# Patient Record
Sex: FEMALE | Race: ASIAN | Employment: OTHER | URBAN - METROPOLITAN AREA
[De-identification: names, ages, dates, MRNs, and addresses within clinical notes are randomized per-mention and may not be internally consistent; named-entity substitution may affect disease eponyms.]

---

## 2017-02-09 ENCOUNTER — OFFICE VISIT (OUTPATIENT)
Dept: FAMILY MEDICINE CLINIC | Age: 62
End: 2017-02-09

## 2017-02-09 VITALS
BODY MASS INDEX: 19.1 KG/M2 | TEMPERATURE: 97.5 F | SYSTOLIC BLOOD PRESSURE: 127 MMHG | RESPIRATION RATE: 20 BRPM | WEIGHT: 91 LBS | HEIGHT: 58 IN | HEART RATE: 67 BPM | DIASTOLIC BLOOD PRESSURE: 73 MMHG | OXYGEN SATURATION: 97 %

## 2017-02-09 DIAGNOSIS — J02.9 PHARYNGITIS, UNSPECIFIED ETIOLOGY: Primary | ICD-10-CM

## 2017-02-09 LAB
S PYO AG THROAT QL: NEGATIVE
VALID INTERNAL CONTROL?: YES

## 2017-02-09 RX ORDER — AZITHROMYCIN 250 MG/1
TABLET, FILM COATED ORAL
Qty: 6 TAB | Refills: 0 | Status: SHIPPED | OUTPATIENT
Start: 2017-02-09 | End: 2017-02-14 | Stop reason: ALTCHOICE

## 2017-02-09 NOTE — MR AVS SNAPSHOT
Visit Information Date & Time Provider Department Dept. Phone Encounter #  
 2/9/2017 10:30 AM [facility] [id number] Your Appointments 2/14/2017  1:15 PM  
Office Visit with Radha Doss MD  
[facility] ([address]) Appt Note: dx CPE  
 [place]. [address]  
  
   
 [address] [place] Upcoming Health Maintenance Date Due ZOSTER VACCINE AGE 60> 7/26/2015 COLONOSCOPY 6/18/2016 BREAST CANCER SCRN MAMMOGRAM 6/23/2016 INFLUENZA AGE 9 TO ADULT 8/1/2016 PAP AKA CERVICAL CYTOLOGY 12/17/2018 DTaP/Tdap/Td series (2 - Td) 4/21/2024 Allergies as of 2/9/2017  Review Complete On: 2/9/2017 By: Radha Doss MD  
 No Known Allergies Current Immunizations  Reviewed on 4/21/2014 Name Date Influenza Vaccine PF 11/5/2013 Tdap 4/21/2014 Not reviewed this visit You Were Diagnosed With   
  
 Codes Comments Pharyngitis, unspecified etiology    -  Primary ICD-10-CM: J02.9 ICD-9-CM: 455 Vitals BP Pulse Temp Resp Height(growth percentile) Weight(growth percentile) 127/73 (BP 1 Location: Left arm, BP Patient Position: Sitting) 67 97.5 °F (36.4 °C) (Oral) 20 4' 10\" (1.473 m) 91 lb (41.3 kg) SpO2 BMI OB Status Smoking Status 97% 19.02 kg/m2 Postmenopausal Never Smoker Vitals History BMI and BSA Data Body Mass Index Body Surface Area 19.02 kg/m 2 1.3 m 2 Preferred Pharmacy Pharmacy Name Phone [facility] [address] [place] [address] Your Updated Medication List  
  
   
This list is accurate as of: 2/9/17 11:09 AM.  Always use your most recent med list.  
  
  
  
  
 acetaminophen 500 mg tablet Commonly known as:  TYLENOL Take  by mouth every six (6) hours as needed for Pain. azithromycin 250 mg tablet Commonly known as:  Osmin Haddad Take 2 tablets today, then take 1 tablet daily  
  
 ergocalciferol 50,000 unit capsule Commonly known as:  ERGOCALCIFEROL Take 1 Cap by mouth every seven (7) days. FLUVIRIN 8029-6555 (PF) Syrg injection Generic drug:  influenza vaccine 2015-16 (4 yr+)(PF)  
  
  
  
  
Prescriptions Printed Refills  
 azithromycin (ZITHROMAX) 250 mg tablet 0 Sig: Take 2 tablets today, then take 1 tablet daily Class: Print We Performed the Following AMB POC RAPID STREP A [84041 CPT(R)] Patient Instructions Sore Throat: Care Instructions Your Care Instructions Infection by bacteria or a virus causes most sore throats. Cigarette smoke, dry air, air pollution, allergies, and yelling can also cause a sore throat. Sore throats can be painful and annoying. Fortunately, most sore throats go away on their own. If you have a bacterial infection, your doctor may prescribe antibiotics. Follow-up care is a key part of your treatment and safety. Be sure to make and go to all appointments, and call your doctor if you are having problems. It's also a good idea to know your test results and keep a list of the medicines you take. How can you care for yourself at home? · If your doctor prescribed antibiotics, take them as directed. Do not stop taking them just because you feel better. You need to take the full course of antibiotics. · Gargle with warm salt water once an hour to help reduce swelling and relieve discomfort. Use 1 teaspoon of salt mixed in 1 cup of warm water. · Take an over-the-counter pain medicine, such as acetaminophen (Tylenol), ibuprofen (Advil, Motrin), or naproxen (Aleve). Read and follow all instructions on the label. · Be careful when taking over-the-counter cold or flu medicines and Tylenol at the same time.  Many of these medicines have acetaminophen, which is Tylenol. Read the labels to make sure that you are not taking more than the recommended dose. Too much acetaminophen (Tylenol) can be harmful. · Drink plenty of fluids. Fluids may help soothe an irritated throat. Hot fluids, such as tea or soup, may help decrease throat pain. · Use over-the-counter throat lozenges to soothe pain. Regular cough drops or hard candy may also help. These should not be given to young children because of the risk of choking. · Do not smoke or allow others to smoke around you. If you need help quitting, talk to your doctor about stop-smoking programs and medicines. These can increase your chances of quitting for good. · Use a vaporizer or humidifier to add moisture to your bedroom. Follow the directions for cleaning the machine. When should you call for help? Call your doctor now or seek immediate medical care if: 
· You have new or worse trouble swallowing. · Your sore throat gets much worse on one side. Watch closely for changes in your health, and be sure to contact your doctor if you do not get better as expected. Where can you learn more? Go to http://tiago-jay.info/. Enter [phone] in the search box to learn more about \"Sore Throat: Care Instructions. \" Current as of: July 29, 2016 Content Version: 11.1 © 5416-9750 Bos[employer]. Care instructions adapted under license by MakeSpace (which disclaims liability or warranty for this information). If you have questions about a medical condition or this instruction, always ask your healthcare professional. [place] [address] any warranty or liability for your use of this information. Introducing [facility] [place] [facility]! Dear Kelly Davidson: 
Thank you for requesting a JamHub account. Our records indicate that you already have an active JamHub account. You can access your account anytime at [email or web address] Did you know that you can access your hospital and ER discharge instructions at any time in [employer]? You can also review all of your test results from your hospital stay or ER visit. Additional Information If you have questions, please visit the Frequently Asked Questions section of the [employer] website at [email or web address] Remember, [employer] is NOT to be used for urgent needs. For medical emergencies, dial 911. Now available from your iPhone and Android! Please provide this summary of care documentation to your next provider. Your primary care clinician is listed as Shahab Pain. If you have any questions after today's visit, please call [phone].

## 2017-02-09 NOTE — PROGRESS NOTES
Chief Complaint   Patient presents with    Sore Throat     x 5 days       Pt is a 64y.o. 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 Pertinent positive as above in HPI. All others were negative    Patient Active Problem List   Diagnosis Code    Vitamin D deficiency E55.9    Esophagitis K20.9    Hyperlipidemia E78.5    Osteoporosis M81.0    Atypical squamous cells of undetermined significance on cytologic smear of cervix (ASC-US) R87.610    Dyspareunia, female N94.10       Past Medical History   Diagnosis Date    Arthritis     ASCUS with positive high risk HPV 11/5/2013     Sees Gyn    GERD (gastroesophageal reflux disease)     Hypercholesterolemia     PUD (peptic ulcer disease)      as a teenager       Current Outpatient Prescriptions   Medication Sig Dispense Refill    azithromycin (ZITHROMAX) 250 mg tablet Take 2 tablets today, then take 1 tablet daily 6 Tab 0    acetaminophen (TYLENOL) 500 mg tablet Take  by mouth every six (6) hours as needed for Pain.  FLUVIRIN 0863-2190, PF, syrg injection   0    ergocalciferol (ERGOCALCIFEROL) 50,000 unit capsule Take 1 Cap by mouth every seven (7) days.  12 Cap 1       History   Smoking Status    Never Smoker   Smokeless Tobacco    Never Used       No Known Allergies    Patient Labs were reviewed: yes      Patient Past Records were reviewed:  yes        Objective:     Vitals:    02/09/17 1036   BP: 127/73   Pulse: 67   Resp: 20   Temp: 97.5 °F (36.4 °C)   TempSrc: Oral   SpO2: 97%   Weight: 91 lb (41.3 kg) Height: 4' 10\" (1.473 m)     Body mass index is 19.02 kg/(m^2). Exam:   Appearance: alert, well appearing,  oriented to person, place, and time, acyanotic, in no respiratory distress and well hydrated. HEENT:  NC/AT, pink conj, anicteric sclerae, nasal congestion but no sinus tenderness; tonsillo-pharyngeal congestion noted but no exudates  Neck:  No cervical lymphadenopathy, no JVD, no thyromegaly, no carotid bruit  Heart:  RRR without M/R/G  Lungs:  CTAB, no rhonchi, rales, or wheezes with good air exchange   Ext:  No C/C/E    Skin: no rash  Neuro: no lateralizing signs, CNs II-XII intact    Rapid strep is negative    Assessment/ Plan:   Frederick Viera was seen today for sore throat. Diagnoses and all orders for this visit:    Pharyngitis, unspecified etiology-reassured this is likely viral; she will take antibiotics only if sxs persist after 7 days from onset or worsen   -     AMB POC RAPID STREP A  -     azithromycin (ZITHROMAX) 250 mg tablet; Take 2 tablets today, then take 1 tablet daily      Follow-up Disposition:  Return if symptoms worsen or fail to improve, otherwise RTC for previous appt. I have discussed the diagnosis with the patient and the intended plan as seen in the above orders. The patient has received an After-Visit Summary and questions were answered concerning future plans. Medication Side Effects and Warnings were discussed with patient: yes    Patient verbalized understanding of above instructions.     Kindra Gresham MD  Internal Medicine  [facility]

## 2017-02-09 NOTE — PROGRESS NOTES
Pt here today for a sore throat x 5 days    1. Have you been to the ER, urgent care clinic since your last visit? Hospitalized since your last visit? No    2. Have you seen or consulted any other health care providers outside of the [address] [place] [address] since your last visit? Include any pap smears or colon screening.  No

## 2017-02-14 ENCOUNTER — OFFICE VISIT (OUTPATIENT)
Dept: FAMILY MEDICINE CLINIC | Age: 62
End: 2017-02-14

## 2017-02-14 VITALS
TEMPERATURE: 97.3 F | OXYGEN SATURATION: 96 % | HEART RATE: 66 BPM | HEIGHT: 58 IN | DIASTOLIC BLOOD PRESSURE: 67 MMHG | WEIGHT: 90 LBS | BODY MASS INDEX: 18.89 KG/M2 | RESPIRATION RATE: 16 BRPM | SYSTOLIC BLOOD PRESSURE: 113 MMHG

## 2017-02-14 DIAGNOSIS — Z00.00 WELL WOMAN EXAM (NO GYNECOLOGICAL EXAM): Primary | ICD-10-CM

## 2017-02-14 DIAGNOSIS — E78.5 HYPERLIPIDEMIA, UNSPECIFIED HYPERLIPIDEMIA TYPE: ICD-10-CM

## 2017-02-14 DIAGNOSIS — E55.9 VITAMIN D DEFICIENCY: ICD-10-CM

## 2017-02-14 DIAGNOSIS — M81.0 OSTEOPOROSIS: ICD-10-CM

## 2017-02-14 DIAGNOSIS — R87.610 ATYPICAL SQUAMOUS CELLS OF UNDETERMINED SIGNIFICANCE ON CYTOLOGIC SMEAR OF CERVIX (ASC-US): ICD-10-CM

## 2017-02-14 LAB
25(OH)D3 SERPL-MCNC: 19.8 NG/ML (ref 32–100)
A-G RATIO,AGRAT: 1.6 RATIO (ref 1.1–2.6)
ALBUMIN SERPL-MCNC: 4.5 G/DL (ref 3.5–5)
ALP SERPL-CCNC: 65 U/L (ref 40–120)
ALT SERPL-CCNC: 10 U/L (ref 5–40)
ANION GAP SERPL CALC-SCNC: 17 MMOL/L
AST SERPL W P-5'-P-CCNC: 18 U/L (ref 10–37)
BASOPHILS ABS-DIF,2030: 0.1 K/UL (ref 0–0.2)
BASOPHILS C MAN (DIFF), 1068: 1 % (ref 0–2)
BILIRUB SERPL-MCNC: 0.4 MG/DL (ref 0.2–1.2)
BUN SERPL-MCNC: 10 MG/DL (ref 6–22)
CALCIUM SERPL-MCNC: 9.3 MG/DL (ref 8.4–10.5)
CHLORIDE SERPL-SCNC: 98 MMOL/L (ref 98–110)
CHOLEST SERPL-MCNC: 223 MG/DL (ref 110–200)
CO2 SERPL-SCNC: 24 MMOL/L (ref 20–32)
CREAT SERPL-MCNC: 0.6 MG/DL (ref 0.8–1.4)
EOS ABS-DIF,2069: 0.1 K/UL (ref 0–0.5)
EOSINOPHILS C MAN (DIFF), 1067: 2 % (ref 0–6)
ERYTHROCYTE [DISTWIDTH] IN BLOOD BY AUTOMATED COUNT: 12.9 % (ref 10–16)
GFRAA, 66117: >60
GFRNA, 66118: >60
GLOBULIN,GLOB: 2.8 G/DL (ref 2–4)
GLUCOSE SERPL-MCNC: 84 MG/DL (ref 65–99)
HCT VFR BLD AUTO: 37.2 % (ref 35.1–48)
HDLC SERPL-MCNC: 86 MG/DL (ref 40–59)
HGB BLD-MCNC: 12.7 G/DL (ref 11.7–16)
LDLC SERPL CALC-MCNC: 122 MG/DL (ref 50–99)
LYMPHOCYTES C MAN (DIFF), 1065: 35 % (ref 27–45)
LYMPHS ABS-DIF,2105: 1.8 K/UL (ref 1–4.8)
MCH RBC QN AUTO: 29 PG (ref 26–34)
MCHC RBC AUTO-ENTMCNC: 34 G/DL (ref 32–36)
MCV RBC AUTO: 84 FL (ref 80–95)
MONOCYTES ABS-DIF,2141: 0.2 K/UL (ref 0.1–0.9)
MONOCYTES C MAN (DIFF), 1066: 3 % (ref 3–9)
NEUTROPHILS ABS,2156: 3 K/UL (ref 1.8–7.7)
NEUTROPHILS C MAN (DIFF), 1064: 59 % (ref 40–75)
NORMOCHROMIC CELLAVISION, 1078: NORMAL
NORMOCYTIC CELLAVISION, 1079: NORMAL
PLATELET # BLD AUTO: 206 K/UL (ref 140–440)
POTASSIUM SERPL-SCNC: 3.9 MMOL/L (ref 3.5–5.5)
PROT SERPL-MCNC: 7.3 G/DL (ref 6.2–8.1)
RBC # BLD AUTO: 4.45 M/UL (ref 3.8–5.2)
SMEAR EVAL, 1131: NORMAL
SODIUM SERPL-SCNC: 139 MMOL/L (ref 133–145)
TRIGL SERPL-MCNC: 74 MG/DL (ref 40–149)
VLDLC SERPL CALC-MCNC: 15 MG/DL (ref 8–30)
WBC # BLD AUTO: 5.1 K/UL (ref 4–11)

## 2017-02-14 NOTE — PROGRESS NOTES
Tung Flores is here today to follow up for chronic conditions. 1. Have you been to the ER, urgent care clinic since your last visit? Hospitalized since your last visit? No    2. Have you seen or consulted any other health care providers outside of [facility] since your [facility] visit? Include any pap smears or colon screening.  No

## 2017-02-14 NOTE — MR AVS SNAPSHOT
Visit Information Date & Time Provider Department Dept. Phone Encounter #  
 2/14/2017  1:15 PM Lucas Maya MD [facility] 13 [id number] Follow-up Instructions Return in about 1 year (around 2/14/2018) for physical.  
  
Upcoming Health Maintenance Date Due ZOSTER VACCINE AGE 60> 7/26/2015 COLONOSCOPY 6/18/2016 BREAST CANCER SCRN MAMMOGRAM 6/23/2016 PAP AKA CERVICAL CYTOLOGY 12/17/2018 DTaP/Tdap/Td series (2 - Td) 4/21/2024 Allergies as of 2/14/2017  Review Complete On: 2/14/2017 By: Shirin Dockery LPN No Known Allergies Current Immunizations  Reviewed on 2/14/2017 Name Date Influenza Vaccine 10/1/2016 Influenza Vaccine PF 11/5/2013 Tdap 4/21/2014 Reviewed by Lucas Maya MD on 2/14/2017 at  1:23 PM  
 Reviewed by Lucas Maya MD on 2/14/2017 at  1:23 PM  
You Were Diagnosed With   
  
 Codes Comments Well woman exam (no gynecological exam)    -  Primary ICD-10-CM: Z00.00 ICD-9-CM: V70.0 Vitamin D deficiency     ICD-10-CM: E55.9 ICD-9-CM: 268.9 Hyperlipidemia, unspecified hyperlipidemia type     ICD-10-CM: E78.5 ICD-9-CM: 272.4 Osteoporosis     ICD-10-CM: M81.0 ICD-9-CM: 733.00 Atypical squamous cells of undetermined significance on cytologic smear of cervix (ASC-US)     ICD-10-CM: R87.610 ICD-9-CM: 795.01 Vitals BP Pulse Temp Resp Height(growth percentile) Weight(growth percentile) 113/67 66 97.3 °F (36.3 °C) (Oral) 16 4' 10\" (1.473 m) 90 lb (40.8 kg) SpO2 BMI OB Status Smoking Status 96% 18.81 kg/m2 Postmenopausal Never Smoker Vitals History BMI and BSA Data Body Mass Index Body Surface Area  
 18.81 kg/m 2 1.29 m 2 Preferred Pharmacy Pharmacy Name Phone [facility] [address], [address] [place] [address] [phone] Your Updated Medication List  
  
Notice  As of 2/14/2017  1:51 PM  
 You have not been prescribed any medications. We Performed the Following REFERRAL TO GASTROENTEROLOGY [NVF52 Custom] Comments:  
 Please evaluate patient for screening colonoscopy done 1st week of June with Dr. Dat Alas at [facility] do open access colonoscopy REFERRAL TO GYNECOLOGY [REF30 Custom] Comments:  
 Please evaluate patient for follow up re cervical lesion noted by Dr. Maribel Glynn on 8/2016-wants to see Dr. Sheena Shone Follow-up Instructions Return in about 1 year (around 2/14/2018) for physical.  
  
To-Do List   
 02/14/2017 Lab:  CBC WITH AUTOMATED DIFF   
  
 02/14/2017 Lab:  LIPID PANEL   
  
 02/14/2017 Lab:  METABOLIC PANEL, COMPREHENSIVE   
  
 02/14/2017 Lab:  VITAMIN D, 25 HYDROXY   
  
 04/15/2017 Imaging:  ELEAZAR MAMMO BI SCREENING INCL CAD Referral Information Referral ID Referred By Referred To  
  
 [id number] Jordyn Rivera Not Available Visits Status Start Date End Date 1 New Request 2/14/17 2/14/18 If your referral has a status of pending review or denied, additional information will be sent to support the outcome of this decision. Referral ID Referred By Referred To  
 [id number] Jordyn Rivera Not Available Visits Status Start Date End Date 1 New Request 2/14/17 2/14/18 If your referral has a status of pending review or denied, additional information will be sent to support the outcome of this decision. Patient Instructions Well Visit, Women 48 to 72: Care Instructions Your Care Instructions Physical exams can help you stay healthy. Your doctor has checked your overall health and may have suggested ways to take good care of yourself. He or she also may have recommended tests. At home, you can help prevent illness with healthy eating, regular exercise, and other steps. Follow-up care is a key part of your treatment and safety.  Be sure to make and go to all appointments, and call your doctor if you are having problems. It's also a good idea to know your test results and keep a list of the medicines you take. How can you care for yourself at home? · Reach and stay at a healthy weight. This will lower your risk for many problems, such as obesity, diabetes, heart disease, and high blood pressure. · Get at least 30 minutes of exercise on most days of the week. Walking is a good choice. You also may want to do other activities, such as running, swimming, cycling, or playing tennis or team sports. · Do not smoke. Smoking can make health problems worse. If you need help quitting, talk to your doctor about stop-smoking programs and medicines. These can increase your chances of quitting for good. · Protect your skin from too much sun. When you're outdoors from 10 a.m. to 4 p.m., stay in the shade or cover up with clothing and a hat with a wide brim. Wear sunglasses that block UV rays. Even when it's cloudy, put broad-spectrum sunscreen (SPF 30 or higher) on any exposed skin. · See a dentist one or two times a year for checkups and to have your teeth cleaned. · Wear a seat belt in the car. · Limit alcohol to 1 drink a day. Too much alcohol can cause health problems. Follow your doctor's advice about when to have certain tests. These tests can spot problems early. · Cholesterol. Your doctor will tell you how often to have this done based on your age, family history, or other things that can increase your risk for heart attack and stroke. · Blood pressure. Have your blood pressure checked during a routine doctor visit. Your doctor will tell you how often to check your blood pressure based on your age, your blood pressure results, and other factors. · Mammogram. Ask your doctor how often you should have a mammogram, which is an X-ray of your breasts. A mammogram can spot breast cancer before it can be felt and when it is easiest to treat. · Pap test and pelvic exam. Ask your doctor how often you should have a Pap test. You may not need to have a Pap test as often as you used to. · Vision. Have your eyes checked every year or two or as often as your doctor suggests. Some experts recommend that you have yearly exams for glaucoma and other age-related eye problems starting at age 48. · Hearing. Tell your doctor if you notice any change in your hearing. You can have tests to find out how well you hear. · Diabetes. Ask your doctor whether you should have tests for diabetes. · Colon cancer. You should begin tests for colon cancer at age 48. You may have one of several tests. Your doctor will tell you how often to have tests based on your age and risk. Risks include whether you already had a precancerous polyp removed from your colon or whether your parents, sisters and brothers, or children have had colon cancer. · Thyroid disease. Talk to your doctor about whether to have your thyroid checked as part of a regular physical exam. Women have an increased chance of a thyroid problem. · Osteoporosis. You should begin tests for bone density at age 72. If you are younger than 72, ask your doctor whether you have factors that may increase your risk for this disease. You may want to have this test before age 72. · Heart attack and stroke risk. At least every 4 to 6 years, you should have your risk for heart attack and stroke assessed. Your doctor uses factors such as your age, blood pressure, cholesterol, and whether you smoke or have diabetes to show what your risk for a heart attack or stroke is over the next 10 years. When should you call for help? Watch closely for changes in your health, and be sure to contact your doctor if you have any problems or symptoms that concern you. Where can you learn more? Go to http://tiago-jay.info/.  
Enter P720 in the search box to learn more about \"Well Visit, Women 48 to 65: Care Instructions. \" Current as of: July 19, 2016 Content Version: 11.1 © 5955-6257 [employer]. Care instructions adapted under license by Fondu (which disclaims liability or warranty for this information). If you have questions about a medical condition or this instruction, always ask your healthcare professional. [address] any warranty or liability for your use of this information. Introducing [facility] & HEALTH SERVICES! Dear Abida Lei: 
Thank you for requesting a EVERFANS account. Our records indicate that you already have an active EVERFANS account. You can access your account anytime at [email or web address] Did you know that you can access your hospital and ER discharge instructions at any time in EVERFANS? You can also review all of your test results from your hospital stay or ER visit. Additional Information If you have questions, please visit the Frequently Asked Questions section of the EVERFANS website at [email or web address] Remember, EVERFANS is NOT to be used for urgent needs. For medical emergencies, dial 911. Now available from your iPhone and Android! Please provide this summary of care documentation to your next provider. Your primary care clinician is listed as Vinny Boateng. If you have any questions after today's visit, please call [phone].

## 2017-02-14 NOTE — PROGRESS NOTES
Chief Complaint   Patient presents with    Osteoporosis     Follow up    Vitamin D Deficiency    Cholesterol Problem       Pt is a 64y.o. year old female who presents for her annual wellness visit    Health Maintenance Due   Topic Date Due    ZOSTER VACCINE AGE 60>  07/26/2015    COLONOSCOPY  06/18/2016    BREAST CANCER SCRN MAMMOGRAM  06/23/2016     Dexa 9/2015-osteoporosis  Mammo last done 2014    Last PAP 8/2016/Gyn note reviewed  Pelvic: External genitalia normal, Vagina normal with physiologic discharge, cervix with an area of brown discoloration from 4 to 7 o'clock just exterior to the os and with slight oozing of blood, no CMT. Pap and HPV obtained.    Repeat Pap. Cervical lesion present, not previously noted. Will follow. Patient says she does not have a follow up visit with Gyn    ROS:    Pt denies: Wt loss, Fever/Chills, HA, Visual changes, Fatigue, Chest pain, SOB, FRANKLIN, Abd pain, N/V/D/C, Blood in stool or urine, Edema. Pertinent positive as above in HPI.  All others were negative    Patient Active Problem List   Diagnosis Code    Vitamin D deficiency E55.9    Esophagitis K20.9    Hyperlipidemia E78.5    Osteoporosis M81.0    Atypical squamous cells of undetermined significance on cytologic smear of cervix (ASC-US) R87.610    Dyspareunia, female N94.10       Past Medical History   Diagnosis Date    Arthritis     ASCUS with positive high risk HPV 11/5/2013     Sees Gyn    GERD (gastroesophageal reflux disease)     Hypercholesterolemia     PUD (peptic ulcer disease)      as a teenager           History   Smoking Status    Never Smoker   Smokeless Tobacco    Never Used       No Known Allergies    Patient Labs were reviewed: yes      Patient Past Records were reviewed:  yes        Objective:     Vitals:    02/14/17 1321   BP: 113/67   Pulse: 66   Resp: 16   Temp: 97.3 °F (36.3 °C)   TempSrc: Oral   SpO2: 96%   Weight: 90 lb (40.8 kg)   Height: 4' 10\" (1.473 m)     Body mass index is 18.81 kg/(m^2). Exam:   Appearance: alert, well appearing,  oriented to person, place, and time, acyanotic, in no respiratory distress and well hydrated. HEENT:  NC/AT, pink conj, anicteric sclerae  Neck:  No cervical lymphadenopathy, no JVD, no thyromegaly, no carotid bruit  Heart:  RRR without M/R/G  Lungs:  CTAB, no rhonchi, rales, or wheezes with good air exchange   Abdomen:  Non-tender, pos bowel sounds, no hepatosplenomegaly  Ext:  No C/C/E    Skin: no rash  Neuro: no lateralizing signs, CNs II-XII intact  Back: no reproducible pain along the spine    Assessment/ Plan:   Julio Banuelos was seen today for osteoporosis, vitamin d deficiency and cholesterol problem. Diagnoses and all orders for this visit:    Well woman exam (no gynecological exam)-Advised re: monthly self breast exam, dental prophylaxis Q 6 months, regular exercise, yearly eye exam, daily intake of Ca+D  -     ELEAZAR MAMMO BI SCREENING INCL CAD; Future  -     REFERRAL TO GASTROENTEROLOGY for screening colonoscopy  -     CBC WITH AUTOMATED DIFF; Future  -     METABOLIC PANEL, COMPREHENSIVE; Future  Advised to get shingles vaccine at her pharmacy      Vitamin D deficiency-s/p rx; currently on OTC Ca+D  -     VITAMIN D, 25 HYDROXY; Future      Hyperlipidemia, unspecified hyperlipidemia type-low cholesterol diet as she declines to take statins  -     LIPID PANEL; Future    Osteoporosis-also declines any tx; advised regular weight bearing exercise, daily Ca+D    Atypical squamous cells of undetermined significance on cytologic smear of cervix (ASC-US)  -     REFERRAL TO GYNECOLOGY for follow up regarding cervical lesion    Normal body mass index (BMI)      Follow-up Disposition:  Return in about 1 year (around 2/14/2018) for physical.        I have discussed the diagnosis with the patient and the intended plan as seen in the above orders. The patient has received an After-Visit Summary and questions were answered concerning future plans.      Medication Side Effects and Warnings were discussed with patient: yes    Patient verbalized understanding of above instructions.     Gerald Veliz MD  Internal Medicine  [facility]

## 2017-02-14 NOTE — PATIENT INSTRUCTIONS
Well Visit, Women 48 to 72: Care Instructions  Your Care Instructions  Physical exams can help you stay healthy. Your doctor has checked your overall health and may have suggested ways to take good care of yourself. He or she also may have recommended tests. At home, you can help prevent illness with healthy eating, regular exercise, and other steps. Follow-up care is a key part of your treatment and safety. Be sure to make and go to all appointments, and call your doctor if you are having problems. It's also a good idea to know your test results and keep a list of the medicines you take. How can you care for yourself at home? · Reach and stay at a healthy weight. This will lower your risk for many problems, such as obesity, diabetes, heart disease, and high blood pressure. · Get at least 30 minutes of exercise on most days of the week. Walking is a good choice. You also may want to do other activities, such as running, swimming, cycling, or playing tennis or team sports. · Do not smoke. Smoking can make health problems worse. If you need help quitting, talk to your doctor about stop-smoking programs and medicines. These can increase your chances of quitting for good. · Protect your skin from too much sun. When you're outdoors from 10 a.m. to 4 p.m., stay in the shade or cover up with clothing and a hat with a wide brim. Wear sunglasses that block UV rays. Even when it's cloudy, put broad-spectrum sunscreen (SPF 30 or higher) on any exposed skin. · See a dentist one or two times a year for checkups and to have your teeth cleaned. · Wear a seat belt in the car. · Limit alcohol to 1 drink a day. Too much alcohol can cause health problems. Follow your doctor's advice about when to have certain tests. These tests can spot problems early. · Cholesterol.  Your doctor will tell you how often to have this done based on your age, family history, or other things that can increase your risk for heart attack and stroke. · Blood pressure. Have your blood pressure checked during a routine doctor visit. Your doctor will tell you how often to check your blood pressure based on your age, your blood pressure results, and other factors. · Mammogram. Ask your doctor how often you should have a mammogram, which is an X-ray of your breasts. A mammogram can spot breast cancer before it can be felt and when it is easiest to treat. · Pap test and pelvic exam. Ask your doctor how often you should have a Pap test. You may not need to have a Pap test as often as you used to. · Vision. Have your eyes checked every year or two or as often as your doctor suggests. Some experts recommend that you have yearly exams for glaucoma and other age-related eye problems starting at age 48. · Hearing. Tell your doctor if you notice any change in your hearing. You can have tests to find out how well you hear. · Diabetes. Ask your doctor whether you should have tests for diabetes. · Colon cancer. You should begin tests for colon cancer at age 48. You may have one of several tests. Your doctor will tell you how often to have tests based on your age and risk. Risks include whether you already had a precancerous polyp removed from your colon or whether your parents, sisters and brothers, or children have had colon cancer. · Thyroid disease. Talk to your doctor about whether to have your thyroid checked as part of a regular physical exam. Women have an increased chance of a thyroid problem. · Osteoporosis. You should begin tests for bone density at age 72. If you are younger than 72, ask your doctor whether you have factors that may increase your risk for this disease. You may want to have this test before age 72. · Heart attack and stroke risk. At least every 4 to 6 years, you should have your risk for heart attack and stroke assessed.  Your doctor uses factors such as your age, blood pressure, cholesterol, and whether you smoke or have diabetes to show what your risk for a heart attack or stroke is over the next 10 years. When should you call for help? Watch closely for changes in your health, and be sure to contact your doctor if you have any problems or symptoms that concern you. Where can you learn more? Go to http://tiago-jay.info/. Enter E090 in the search box to learn more about \"Well Visit, Women 50 to 72: Care Instructions. \"  Current as of: July 19, 2016  Content Version: 11.1  © 2025-5994 [employer]. Care instructions adapted under license by [employer] (which disclaims liability or warranty for this information). If you have questions about a medical condition or this instruction, always ask your healthcare professional. [address] any warranty or liability for your use of this information.

## 2017-02-15 DIAGNOSIS — E55.9 VITAMIN D DEFICIENCY: Primary | ICD-10-CM

## 2017-02-15 RX ORDER — ERGOCALCIFEROL 1.25 MG/1
50000 CAPSULE ORAL
Qty: 12 CAP | Refills: 1 | Status: SHIPPED | OUTPATIENT
Start: 2017-02-15 | End: 2018-04-24 | Stop reason: ALTCHOICE

## 2017-02-15 NOTE — PROGRESS NOTES
pls let patient know cholesterol went down from 259 to 223 so continue to avoid fatty food  Vit d is low so I will send rx  The rest of the labs were normal

## 2017-02-23 ENCOUNTER — TELEPHONE (OUTPATIENT)
Dept: FAMILY MEDICINE CLINIC | Age: 62
End: 2017-02-23

## 2017-02-23 NOTE — TELEPHONE ENCOUNTER
Called and spoke with Guanakito Ibrahim. Verified two patient identifiers. Informed patient of lab results per provider. Made patient aware that her cholesterol has decreased so she should continue to avoid fatty foods, also informed patient that her Vit D is low so PCP sent a prescription to her pharmacy. Made patient aware that the rest of her labs were normal. Patient verbalized understanding.

## 2017-03-08 ENCOUNTER — HOSPITAL ENCOUNTER (OUTPATIENT)
Dept: MAMMOGRAPHY | Age: 62
Discharge: HOME | End: 2017-03-08
Attending: INTERNAL MEDICINE
Payer: COMMERCIAL

## 2017-03-08 DIAGNOSIS — Z00.00 WELL WOMAN EXAM (NO GYNECOLOGICAL EXAM): ICD-10-CM

## 2017-03-08 PROCEDURE — 77067 SCR MAMMO BI INCL CAD: CPT

## 2017-03-10 ENCOUNTER — OFFICE VISIT (OUTPATIENT)
Dept: OBGYN CLINIC | Age: 62
End: 2017-03-10

## 2017-03-10 VITALS
HEART RATE: 59 BPM | SYSTOLIC BLOOD PRESSURE: 137 MMHG | BODY MASS INDEX: 18.81 KG/M2 | WEIGHT: 90 LBS | DIASTOLIC BLOOD PRESSURE: 69 MMHG

## 2017-03-10 DIAGNOSIS — Z01.42 SURVEILLANCE PAP FOLLOWING ABNORMAL VAGINAL PAP: Primary | ICD-10-CM

## 2017-03-10 NOTE — PROGRESS NOTES
[facility]  [id number] [place] [address]    Name: Cookie Chavarria MRN: [id number] SSN: [id number]    YOB: 1955  Age: 64 y.o. Sex: female       Subjective:      Chief complaint:    Chief Complaint   Patient presents with   Amaury Cervantes is a 64 y.o. female presents today for follow up pap smear following abnormal pap and colposcopy. No complaints today. No vaginal bleeding. Review of Systems:   General ROS: negative for - fever, chills, malaise  Respiratory ROS: no cough, shortness of breath, or wheezing  Cardiovascular ROS: no chest pain or dyspnea on exertion  Gastrointestinal ROS: no abdominal pain, change in bowel habits, or black or bloody stools, nausea, vomiting  Genito-Urinary ROS: no dysuria, trouble voiding, incontinence or hematuria. No pelvic pain, abnormal bleeding, unusual discharge    OB History     No data available        Gynecology:  Health maintenance:  Mammogram up to date. Colonoscopy up to date. .   hx of abnormal pap smear. Last pap:  12/2015. Past Medical History:   Diagnosis Date    Arthritis     ASCUS with positive high risk HPV 11/5/2013    Sees Gyn    GERD (gastroesophageal reflux disease)     Hypercholesterolemia     PUD (peptic ulcer disease)     as a teenager     Past Surgical History:   Procedure Laterality Date    HX COLONOSCOPY  2010    found polyps    HX COLPOSCOPY  08/2013    HX GI      HX GYN       Family History   Problem Relation Age of Onset    Hypertension Mother     Stroke Father      Social History     Occupational History    Not on file.      Social History Main Topics    Smoking status: Never Smoker    Smokeless tobacco: Never Used    Alcohol use No    Drug use: No    Sexual activity: Yes     Partners: Male     Birth control/ protection: None     Family History   Problem Relation Age of Onset    Hypertension Mother     Stroke Father      No Known Allergies  Prior to Admission medications    Medication Sig Start Date End Date Taking? Authorizing Provider   ergocalciferol (ERGOCALCIFEROL) 50,000 unit capsule Take 1 Cap by mouth every seven (7) days. 2/15/17  Yes Tamika Fong MD          Objective:     Vitals:    03/10/17 1148   BP: 137/69   Pulse: (!) 59   Weight: 90 lb (40.8 kg)     Body mass index is 18.81 kg/(m^2). Physical Exam:  General appearance - well appearing, and in no distress and well hydrated  Mental status - alert, oriented to person, place, and time, normal mood, behavior, speech, dress, motor activity, and thought processes  Neck:  No lymphadenopathy, no thyroid enlargement/tenderness  Respiratory:  Normal respiratory effort, no intercostal retractions or use of accessory muscles. Breasts - breasts appear normal, no suspicious masses, no skin or nipple changes or axillary nodes  Abdomen - soft, nontender, nondistended, no masses or organomegaly  Pelvic - No inguinal lymphadenopathy, normal urethral meatus and no bladder tenderness. VULVA: normal appearing vulva with no masses, tenderness or lesions, VAGINA: narrow introitus, normal appearing vagina with normal color and discharge, no lesions, PELVIC FLOOR EXAM: no cystocele, rectocele or prolapse noted, CERVIX: flushed with vaginal apex, dark red/brown area between 4-7:00. Pap smear done UTERUS: uterus is normal size, shape, consistency and nontender, mobile, ADNEXA: nonpalpable. Extremities - No edema. Skin - normal coloration and turgor, no rashes, no suspicious skin lesions noted    Assessment/Plan:       ICD-10-CM ICD-9-CM    1. Surveillance Pap following abnormal Pap Z01.42 V72.32 PAP IG, APTIMA HPV AND RFX 16/18,45 ([id number])     Follow up depending on result. Plan of care discussed. Patient expressed understanding.       Signed By:  Denis Culp DO     March 10, 2017

## 2017-03-14 LAB — PAP IMAGE GUIDED, 8900296: ABNORMAL

## 2017-03-20 LAB
MISCELLANEOUS TEST,99000: NORMAL
SENT TO, 434: NORMAL
TEST NAME, 435: NORMAL

## 2018-04-24 ENCOUNTER — OFFICE VISIT (OUTPATIENT)
Dept: FAMILY MEDICINE CLINIC | Age: 63
End: 2018-04-24

## 2018-04-24 VITALS
HEART RATE: 54 BPM | BODY MASS INDEX: 19.1 KG/M2 | HEIGHT: 58 IN | TEMPERATURE: 95.7 F | WEIGHT: 91 LBS | RESPIRATION RATE: 16 BRPM | DIASTOLIC BLOOD PRESSURE: 70 MMHG | OXYGEN SATURATION: 99 % | SYSTOLIC BLOOD PRESSURE: 128 MMHG

## 2018-04-24 DIAGNOSIS — E78.5 HYPERLIPIDEMIA, UNSPECIFIED HYPERLIPIDEMIA TYPE: ICD-10-CM

## 2018-04-24 DIAGNOSIS — Z12.11 SCREENING FOR COLON CANCER: ICD-10-CM

## 2018-04-24 DIAGNOSIS — Z86.010 HISTORY OF COLON POLYPS: ICD-10-CM

## 2018-04-24 DIAGNOSIS — Z12.39 SCREENING FOR BREAST CANCER: ICD-10-CM

## 2018-04-24 DIAGNOSIS — M81.0 OSTEOPOROSIS WITHOUT CURRENT PATHOLOGICAL FRACTURE, UNSPECIFIED OSTEOPOROSIS TYPE: ICD-10-CM

## 2018-04-24 DIAGNOSIS — E55.9 VITAMIN D DEFICIENCY: ICD-10-CM

## 2018-04-24 DIAGNOSIS — Z00.00 WELL WOMAN EXAM (NO GYNECOLOGICAL EXAM): Primary | ICD-10-CM

## 2018-04-24 NOTE — PROGRESS NOTES
Chief Complaint   Patient presents with    Annual Wellness Visit     Patient is fasting for lab       Pt is a 58y.o. year old female who presents for her annual wellness visit    Health Maintenance Due   Topic Date Due    ZOSTER VACCINE AGE 60>  05/26/2015-advised shingrix    COLONOSCOPY  06/18/2016-done where? At Dr. Darius Baker, due     Fasting for labs  Lab Results   Component Value Date/Time    Cholesterol, total 223 (H) 02/14/2017 02:09 PM    HDL Cholesterol 86 (H) 02/14/2017 02:09 PM    LDL, calculated 122 (H) 02/14/2017 02:09 PM    VLDL, calculated 15 02/14/2017 02:09 PM    Triglyceride 74 02/14/2017 02:09 PM       Mammo done 3/2017-due  Dexa done 2015-osteoporosis    PAP-  Notes Recorded by You Devlin DO on 3/21/2017 at 8:49 PM  ASCUS, POSITIVE HIGH RISK HPV, NEGATIVE 16/18/45 TYPES. Recommend:  Repeat pap smear in 3 years. Lab Results   Component Value Date/Time    VITAMIN D, 25-HYDROXY 19.8 (L) 02/14/2017 02:09 PM     S/p rx    Exercises regularly 20 min/day    Spot on the abdominal area that was itchy-used psoriasis cream OTC    ROS:    Pt denies: Wt loss, Fever/Chills, HA, Visual changes, Fatigue, Chest pain, SOB, FRANKLIN, Abd pain, N/V/D/C, Blood in stool or urine, Edema. Pertinent positive as above in HPI.  All others were negative    Patient Active Problem List   Diagnosis Code    Vitamin D deficiency E55.9    Hyperlipidemia E78.5    Osteoporosis M81.0    Atypical squamous cells of undetermined significance on cytologic smear of cervix (ASC-US) R87.610    Dyspareunia, female N94.10       Past Medical History:   Diagnosis Date    Arthritis     ASCUS with positive high risk HPV 11/5/2013    Sees Gyn    Esophagitis 6/18/2013    Has been on Omeprazole for a while now    GERD (gastroesophageal reflux disease)     Hypercholesterolemia     PUD (peptic ulcer disease)     as a teenager           History   Smoking Status    Never Smoker   Smokeless Tobacco    Never Used No Known Allergies    Patient Labs were reviewed: yes      Patient Past Records were reviewed:  yes        Objective:     Vitals:    04/24/18 0854   BP: 128/70   Pulse: (!) 54   Resp: 16   Temp: 95.7 °F (35.4 °C)   TempSrc: Oral   SpO2: 99%   Weight: 91 lb (41.3 kg)   Height: 4' 10\" (1.473 m)     Body mass index is 19.02 kg/(m^2). Exam:   Appearance: alert, well appearing,  oriented to person, place, and time, acyanotic, in no respiratory distress and well hydrated. HEENT:  NC/AT, pink conj, anicteric sclerae  Neck:  No cervical lymphadenopathy, no JVD, no thyromegaly, no carotid bruit  Heart:  RRR without M/R/G  Lungs:  CTAB, no rhonchi, rales, or wheezes with good air exchange   Abdomen:  Non-tender, pos bowel sounds, no hepatosplenomegaly  Ext:  No C/C/E    Skin: no rash  Neuro: no lateralizing signs, CNs II-XII intact  Breast exam: no palpable masses bilaterally, no nipple discharge, no axillary adenopathy, no skin changes  Back: no tenderness along the spine    Assessment/ Plan:   Diagnoses and all orders for this visit:    1. Well woman exam (no gynecological exam)-Advised re: monthly self breast exam, dental prophylaxis Q 6 months, regular exercise, yearly eye exam, daily intake of Ca+D  -     CBC WITH AUTOMATED DIFF; Future  -     METABOLIC PANEL, COMPREHENSIVE; Future  -     LIPID PANEL; Future  Advised to get Shingrix at her pharmacy    2. Screening for colon cancer  -     REFERRAL TO GASTROENTEROLOGY-given number to schedule ehr follow up colonoscopy in the summer    3. History of colon polyps-as above  -     REFERRAL TO GASTROENTEROLOGY    4. Osteoporosis without current pathological fracture, unspecified osteoporosis type-declined tx; continue with daily Ca+d, weight bearing exercises  -     DEXA BONE DENSITY STUDY AXIAL; Future    5. Vitamin D deficiency-s/p rx  -     VITAMIN D, 25 HYDROXY; Future    6.  Hyperlipidemia, unspecified hyperlipidemia type-low cholesterol diet advised  -     LIPID PANEL; Future    7. Screening for breast cancer  -     [facility] MAMMO BI SCREENING INCL CAD; Future      Follow-up Disposition:  Return in about 1 year (around 4/24/2019) for physical.        I have discussed the diagnosis with the patient and the intended plan as seen in the above orders. The patient has received an After-Visit Summary and questions were answered concerning future plans. Medication Side Effects and Warnings were discussed with patient: yes    Patient verbalized understanding of above instructions.     Laura Calvin MD  Internal Medicine  [facility]

## 2018-04-24 NOTE — PATIENT INSTRUCTIONS
Well Visit, Women 48 to 72: Care Instructions  Your Care Instructions    Physical exams can help you stay healthy. Your doctor has checked your overall health and may have suggested ways to take good care of yourself. He or she also may have recommended tests. At home, you can help prevent illness with healthy eating, regular exercise, and other steps. Follow-up care is a key part of your treatment and safety. Be sure to make and go to all appointments, and call your doctor if you are having problems. It's also a good idea to know your test results and keep a list of the medicines you take. How can you care for yourself at home? · Reach and stay at a healthy weight. This will lower your risk for many problems, such as obesity, diabetes, heart disease, and high blood pressure. · Get at least 30 minutes of exercise on most days of the week. Walking is a good choice. You also may want to do other activities, such as running, swimming, cycling, or playing tennis or team sports. · Do not smoke. Smoking can make health problems worse. If you need help quitting, talk to your doctor about stop-smoking programs and medicines. These can increase your chances of quitting for good. · Protect your skin from too much sun. When you're outdoors from 10 a.m. to 4 p.m., stay in the shade or cover up with clothing and a hat with a wide brim. Wear sunglasses that block UV rays. Even when it's cloudy, put broad-spectrum sunscreen (SPF 30 or higher) on any exposed skin. · See a dentist one or two times a year for checkups and to have your teeth cleaned. · Wear a seat belt in the car. · Limit alcohol to 1 drink a day. Too much alcohol can cause health problems. Follow your doctor's advice about when to have certain tests. These tests can spot problems early. · Cholesterol.  Your doctor will tell you how often to have this done based on your age, family history, or other things that can increase your risk for heart attack and stroke. · Blood pressure. Have your blood pressure checked during a routine doctor visit. Your doctor will tell you how often to check your blood pressure based on your age, your blood pressure results, and other factors. · Mammogram. Ask your doctor how often you should have a mammogram, which is an X-ray of your breasts. A mammogram can spot breast cancer before it can be felt and when it is easiest to treat. · Pap test and pelvic exam. Ask your doctor how often you should have a Pap test. You may not need to have a Pap test as often as you used to. · Vision. Have your eyes checked every year or two or as often as your doctor suggests. Some experts recommend that you have yearly exams for glaucoma and other age-related eye problems starting at age 48. · Hearing. Tell your doctor if you notice any change in your hearing. You can have tests to find out how well you hear. · Diabetes. Ask your doctor whether you should have tests for diabetes. · Colon cancer. You should begin tests for colon cancer at age 48. You may have one of several tests. Your doctor will tell you how often to have tests based on your age and risk. Risks include whether you already had a precancerous polyp removed from your colon or whether your parents, sisters and brothers, or children have had colon cancer. · Thyroid disease. Talk to your doctor about whether to have your thyroid checked as part of a regular physical exam. Women have an increased chance of a thyroid problem. · Osteoporosis. You should begin tests for bone density at age 72. If you are younger than 72, ask your doctor whether you have factors that may increase your risk for this disease. You may want to have this test before age 72. · Heart attack and stroke risk. At least every 4 to 6 years, you should have your risk for heart attack and stroke assessed.  Your doctor uses factors such as your age, blood pressure, cholesterol, and whether you smoke or have diabetes to show what your risk for a heart attack or stroke is over the next 10 years. When should you call for help? Watch closely for changes in your health, and be sure to contact your doctor if you have any problems or symptoms that concern you. Where can you learn more? Go to http://tiago-jay.info/. Enter Y091 in the search box to learn more about \"Well Visit, Women 50 to 72: Care Instructions. \"  Current as of: May 12, 2017  Content Version: 11.4  © 2392-1592 [employer]. Care instructions adapted under license by [employer] (which disclaims liability or warranty for this information). If you have questions about a medical condition or this instruction, always ask your healthcare professional. [address] any warranty or liability for your use of this information.

## 2018-04-24 NOTE — PROGRESS NOTES
Chief Complaint   Patient presents with   Amy Morales Annual Wellness Visit     Patient is fasting for lab     1. Have you been to the ER, urgent care clinic since your last visit? Hospitalized since your last visit? No    2. Have you seen or consulted any other health care providers outside of the [facility] since your last visit? Include any pap smears or colon screening.  No

## 2018-04-24 NOTE — MR AVS SNAPSHOT
Alyson Ortiz [place] [address] [facility] [address]. [address] [facility] [address] [id number] [phone] Patient: Glenn Stanley MRN: [id number] Visit Information Date & Time Provider Department Dept. Phone Encounter #  
 4/24/2018  8:45 AM Austin Farris MD Betty 13 [id number] Follow-up Instructions Return in about 1 year (around 4/24/2019) for physical.  
  
Upcoming Health Maintenance Date Due ZOSTER VACCINE AGE 60> 5/26/2015 COLONOSCOPY 6/18/2016 BREAST CANCER SCRN MAMMOGRAM 3/8/2019 PAP AKA CERVICAL CYTOLOGY 3/10/2020 DTaP/Tdap/Td series (2 - Td) 4/21/2024 Allergies as of 4/24/2018  Review Complete On: 4/24/2018 By: Austin Farris MD  
 No Known Allergies Current Immunizations  Reviewed on 4/24/2018 Name Date Influenza Vaccine 9/21/2017, 10/1/2016 Influenza Vaccine [employer] Sebastian) 9/21/2017 Influenza Vaccine PF 11/5/2013 Tdap 4/21/2014 Reviewed by Austin Farris MD on 4/24/2018 at  9:14 AM  
You Were Diagnosed With   
  
 Codes Comments Well woman exam (no gynecological exam)    -  Primary ICD-10-CM: Z00.00 ICD-9-CM: V70.0 Screening for colon cancer     ICD-10-CM: Z12.11 ICD-9-CM: V76.51 History of colon polyps     ICD-10-CM: Z86.010 
ICD-9-CM: V12.72 Osteoporosis without current pathological fracture, unspecified osteoporosis type     ICD-10-CM: M81.0 ICD-9-CM: 733.00 Vitamin D deficiency     ICD-10-CM: E55.9 ICD-9-CM: 268.9 Hyperlipidemia, unspecified hyperlipidemia type     ICD-10-CM: E78.5 ICD-9-CM: 272.4 Screening for breast cancer     ICD-10-CM: Z12.31 
ICD-9-CM: V76.10 Vitals BP Pulse Temp Resp Height(growth percentile) Weight(growth percentile) 128/70 (!) 54 95.7 °F (35.4 °C) (Oral) 16 4' 10\" (1.473 m) 91 lb (41.3 kg) SpO2 BMI OB Status Smoking Status 99% 19.02 kg/m2 Postmenopausal Never Smoker Vitals History BMI and BSA Data Body Mass Index Body Surface Area 19.02 kg/m 2 1.3 m 2 Preferred Pharmacy Pharmacy Name Phone [facility] [address], [address] [place] [address] [phone] Your Updated Medication List  
  
Notice  As of 4/24/2018  9:39 AM  
 You have not been prescribed any medications. We Performed the Following REFERRAL TO GASTROENTEROLOGY [SSQ38 Custom] Comments:  
 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  
  
To-Do List   
 04/24/2018 Lab:  CBC WITH AUTOMATED DIFF   
  
 04/24/2018 Lab:  LIPID PANEL   
  
 04/24/2018 Lab:  METABOLIC PANEL, COMPREHENSIVE   
  
 04/24/2018 Lab:  VITAMIN D, 25 HYDROXY   
  
 05/24/2018 Imaging:  DEXA BONE DENSITY STUDY AXIAL   
  
 06/23/2018 Imaging:  ELEAZAR MAMMO BI SCREENING INCL CAD Referral Information Referral ID Referred By Referred To  
  
 [id number] Rebecca North MD   
   [place] [address] [address] [place] [address] Phone: [phone] Fax: [phone] Visits Status Start Date End Date 1 New Request 4/24/18 4/24/19 If your referral has a status of pending review or denied, additional information will be sent to support the outcome of this decision. Referral ID Referred By Referred To  
 [id number] Rebecca Caal Not Available Visits Status Start Date End Date 1 New Request 4/24/18 4/24/19 If your referral has a status of pending review or denied, additional information will be sent to support the outcome of this decision. Patient Instructions Well Visit, Women 48 to 72: Care Instructions Your Care Instructions Physical exams can help you stay healthy. Your doctor has checked your overall health and may have suggested ways to take good care of yourself. He or she also may have recommended tests. At home, you can help prevent illness with healthy eating, regular exercise, and other steps. Follow-up care is a key part of your treatment and safety. Be sure to make and go to all appointments, and call your doctor if you are having problems. It's also a good idea to know your test results and keep a list of the medicines you take. How can you care for yourself at home? · Reach and stay at a healthy weight. This will lower your risk for many problems, such as obesity, diabetes, heart disease, and high blood pressure. · Get at least 30 minutes of exercise on most days of the week. Walking is a good choice. You also may want to do other activities, such as running, swimming, cycling, or playing tennis or team sports. · Do not smoke. Smoking can make health problems worse. If you need help quitting, talk to your doctor about stop-smoking programs and medicines. These can increase your chances of quitting for good. · Protect your skin from too much sun. When you're outdoors from 10 a.m. to 4 p.m., stay in the shade or cover up with clothing and a hat with a wide brim. Wear sunglasses that block UV rays. Even when it's cloudy, put broad-spectrum sunscreen (SPF 30 or higher) on any exposed skin. · See a dentist one or two times a year for checkups and to have your teeth cleaned. · Wear a seat belt in the car. · Limit alcohol to 1 drink a day. Too much alcohol can cause health problems. Follow your doctor's advice about when to have certain tests. These tests can spot problems early. · Cholesterol. Your doctor will tell you how often to have this done based on your age, family history, or other things that can increase your risk for heart attack and stroke. · Blood pressure. Have your blood pressure checked during a routine doctor visit.  Your doctor will tell you how often to check your blood pressure based on your age, your blood pressure results, and other factors. · Mammogram. Ask your doctor how often you should have a mammogram, which is an X-ray of your breasts. A mammogram can spot breast cancer before it can be felt and when it is easiest to treat. · Pap test and pelvic exam. Ask your doctor how often you should have a Pap test. You may not need to have a Pap test as often as you used to. · Vision. Have your eyes checked every year or two or as often as your doctor suggests. Some experts recommend that you have yearly exams for glaucoma and other age-related eye problems starting at age 48. · Hearing. Tell your doctor if you notice any change in your hearing. You can have tests to find out how well you hear. · Diabetes. Ask your doctor whether you should have tests for diabetes. · Colon cancer. You should begin tests for colon cancer at age 48. You may have one of several tests. Your doctor will tell you how often to have tests based on your age and risk. Risks include whether you already had a precancerous polyp removed from your colon or whether your parents, sisters and brothers, or children have had colon cancer. · Thyroid disease. Talk to your doctor about whether to have your thyroid checked as part of a regular physical exam. Women have an increased chance of a thyroid problem. · Osteoporosis. You should begin tests for bone density at age 72. If you are younger than 72, ask your doctor whether you have factors that may increase your risk for this disease. You may want to have this test before age 72. · Heart attack and stroke risk. At least every 4 to 6 years, you should have your risk for heart attack and stroke assessed. Your doctor uses factors such as your age, blood pressure, cholesterol, and whether you smoke or have diabetes to show what your risk for a heart attack or stroke is over the next 10 years. When should you call for help? Watch closely for changes in your health, and be sure to contact your doctor if you have any problems or symptoms that concern you. Where can you learn more? Go to http://tiago-jay.info/. Enter Y709 in the search box to learn more about \"Well Visit, Women 50 to 72: Care Instructions. \" Current as of: May 12, 2017 Content Version: 11.4 © 2879-8972 "[employer]. Care instructions adapted under license by [employer] (which disclaims liability or warranty for this information). If you have questions about a medical condition or this instruction, always ask your healthcare professional. [place] [address] any warranty or liability for your use of this information. Introducing [facility]! Dear Andrew Pope: 
Thank you for requesting a [employer] account. Our records indicate that you already have an active [employer] account. You can access your account anytime at [email or web address] Did you know that you can access your hospital and ER discharge instructions at any time in [employer]? You can also review all of your test results from your hospital stay or ER visit. Additional Information If you have questions, please visit the Frequently Asked Questions section of the [employer] website at [email or web address] Remember, [employer] is NOT to be used for urgent needs. For medical emergencies, dial 911. Now available from your iPhone and Android! Please provide this summary of care documentation to your next provider. Your primary care clinician is listed as Mansi Hendrickson. If you have any questions after today's visit, please call [phone].

## 2018-04-25 DIAGNOSIS — E55.9 VITAMIN D DEFICIENCY: Primary | ICD-10-CM

## 2018-04-25 LAB
25(OH)D3 SERPL-MCNC: 20.6 NG/ML (ref 32–100)
A-G RATIO,AGRAT: 1.6 RATIO (ref 1.1–2.6)
ABSOLUTE LYMPHOCYTE COUNT, 10803: 1.3 K/UL (ref 1–4.8)
ALBUMIN SERPL-MCNC: 4.5 G/DL (ref 3.5–5)
ALP SERPL-CCNC: 66 U/L (ref 40–120)
ALT SERPL-CCNC: 13 U/L (ref 5–40)
ANION GAP SERPL CALC-SCNC: 16 MMOL/L
AST SERPL W P-5'-P-CCNC: 19 U/L (ref 10–37)
BASOPHILS # BLD: 0 K/UL (ref 0–0.2)
BASOPHILS NFR BLD: 1 % (ref 0–2)
BILIRUB SERPL-MCNC: 0.5 MG/DL (ref 0.2–1.2)
BUN SERPL-MCNC: 8 MG/DL (ref 6–22)
CALCIUM SERPL-MCNC: 9.3 MG/DL (ref 8.4–10.5)
CHLORIDE SERPL-SCNC: 98 MMOL/L (ref 98–110)
CHOLEST SERPL-MCNC: 246 MG/DL (ref 110–200)
CO2 SERPL-SCNC: 27 MMOL/L (ref 20–32)
CREAT SERPL-MCNC: 0.5 MG/DL (ref 0.8–1.4)
EOSINOPHIL # BLD: 0 K/UL (ref 0–0.5)
EOSINOPHIL NFR BLD: 1 % (ref 0–6)
ERYTHROCYTE [DISTWIDTH] IN BLOOD BY AUTOMATED COUNT: 13.5 % (ref 10–15.5)
GFRAA, 66117: >60
GFRNA, 66118: >60
GLOBULIN,GLOB: 2.9 G/DL (ref 2–4)
GLUCOSE SERPL-MCNC: 91 MG/DL (ref 70–99)
GRANULOCYTES,GRANS: 59 % (ref 40–75)
HCT VFR BLD AUTO: 39.3 % (ref 35.1–48)
HDLC SERPL-MCNC: 2.7 MG/DL (ref 0–5)
HDLC SERPL-MCNC: 91 MG/DL (ref 40–59)
HGB BLD-MCNC: 12.9 G/DL (ref 11.7–16)
LDLC SERPL CALC-MCNC: 137 MG/DL (ref 50–99)
LYMPHOCYTES, LYMLT: 33 % (ref 20–45)
MCH RBC QN AUTO: 29 PG (ref 26–34)
MCHC RBC AUTO-ENTMCNC: 33 G/DL (ref 31–36)
MCV RBC AUTO: 89 FL (ref 80–95)
MONOCYTES # BLD: 0.2 K/UL (ref 0.1–1)
MONOCYTES NFR BLD: 6 % (ref 3–12)
NEUTROPHILS # BLD AUTO: 2.3 K/UL (ref 1.8–7.7)
PLATELET # BLD AUTO: 221 K/UL (ref 140–440)
PMV BLD AUTO: 9.8 FL (ref 9–13)
POTASSIUM SERPL-SCNC: 4.3 MMOL/L (ref 3.5–5.5)
PROT SERPL-MCNC: 7.4 G/DL (ref 6.2–8.1)
RBC # BLD AUTO: 4.42 M/UL (ref 3.8–5.2)
SODIUM SERPL-SCNC: 141 MMOL/L (ref 133–145)
TRIGL SERPL-MCNC: 94 MG/DL (ref 40–149)
VLDLC SERPL CALC-MCNC: 19 MG/DL (ref 8–30)
WBC # BLD AUTO: 3.9 K/UL (ref 4–11)

## 2018-04-25 RX ORDER — ERGOCALCIFEROL 1.25 MG/1
50000 CAPSULE ORAL
Qty: 12 CAP | Refills: 1 | Status: SHIPPED | OUTPATIENT
Start: 2018-04-25 | End: 2019-03-11 | Stop reason: SDUPTHER

## 2018-04-25 NOTE — PROGRESS NOTES
pls let patient know blood sugar/kidney and liver tests normal  Choelsterol is up but HDL or good cholesterol is way up so just watch diet and avoid fatty food  Vit d is low again so I will send rx

## 2018-05-01 ENCOUNTER — TELEPHONE (OUTPATIENT)
Dept: FAMILY MEDICINE CLINIC | Age: 63
End: 2018-05-01

## 2018-05-01 NOTE — TELEPHONE ENCOUNTER
----- Message from Carrillo Jarrett MD sent at 4/25/2018  2:54 PM EDT -----  pls let patient know blood sugar/kidney and liver tests normal  Choelsterol is up but HDL or good cholesterol is way up so just watch diet and avoid fatty food  Vit d is low again so I will send rx

## 2018-05-01 NOTE — LETTER
5/4/2018 12:19 PM 
 
Ms. Randa Shaw [address] [place] [id number] Dear Randa Shaw: My office staff has been trying to reach you and has been unsuccessful. As always, our goal is to be your partner in life-long wellness. Please call the office at [phone] for your recent lab results and to update your contact information. Sincerely, Lay Boykin MD

## 2019-03-11 ENCOUNTER — OFFICE VISIT (OUTPATIENT)
Dept: FAMILY MEDICINE CLINIC | Age: 64
End: 2019-03-11

## 2019-03-11 VITALS
HEIGHT: 58 IN | RESPIRATION RATE: 15 BRPM | WEIGHT: 91.2 LBS | HEART RATE: 68 BPM | SYSTOLIC BLOOD PRESSURE: 126 MMHG | OXYGEN SATURATION: 96 % | DIASTOLIC BLOOD PRESSURE: 72 MMHG | TEMPERATURE: 95.8 F | BODY MASS INDEX: 19.14 KG/M2

## 2019-03-11 DIAGNOSIS — H26.9 CATARACT OF BOTH EYES, UNSPECIFIED CATARACT TYPE: ICD-10-CM

## 2019-03-11 DIAGNOSIS — E78.5 HYPERLIPIDEMIA, UNSPECIFIED HYPERLIPIDEMIA TYPE: ICD-10-CM

## 2019-03-11 DIAGNOSIS — M81.0 OSTEOPOROSIS WITHOUT CURRENT PATHOLOGICAL FRACTURE, UNSPECIFIED OSTEOPOROSIS TYPE: ICD-10-CM

## 2019-03-11 DIAGNOSIS — E55.9 VITAMIN D DEFICIENCY: ICD-10-CM

## 2019-03-11 DIAGNOSIS — H04.123 BILATERAL DRY EYES: ICD-10-CM

## 2019-03-11 DIAGNOSIS — N95.0 POSTMENOPAUSAL BLEEDING: Primary | ICD-10-CM

## 2019-03-11 RX ORDER — CHOLECALCIFEROL (VITAMIN D3) 125 MCG
100 CAPSULE ORAL DAILY
COMMUNITY
End: 2021-01-04

## 2019-03-11 RX ORDER — BISMUTH SUBSALICYLATE 262 MG
1 TABLET,CHEWABLE ORAL DAILY
COMMUNITY

## 2019-03-11 RX ORDER — ERGOCALCIFEROL 1.25 MG/1
50000 CAPSULE ORAL
Qty: 12 CAP | Refills: 1 | Status: SHIPPED | OUTPATIENT
Start: 2019-03-11 | End: 2019-09-19 | Stop reason: ALTCHOICE

## 2019-03-11 NOTE — PATIENT INSTRUCTIONS
Vaginal Bleeding After Menopause: Care Instructions  Your Care Instructions    Vaginal bleeding after menopause can have many causes. Causes may include infection, inflammation, prescription hormones, abnormal growths, and injury. Your doctor may want you to have more tests to find the cause of your vaginal bleeding. Follow-up care is a key part of your treatment and safety. Be sure to make and go to all appointments, and call your doctor if you are having problems. It's also a good idea to know your test results and keep a list of the medicines you take. How can you care for yourself at home? · If your doctor gave you medicine, take it exactly as prescribed. Call your doctor if you think you are having a problem with your medicine. · Do not have sex or put anything inside your vagina until you talk with your doctor. · Do not douche. When should you call for help? Call 911 anytime you think you may need emergency care. For example, call if:    · You passed out (lost consciousness).    Call your doctor now or seek immediate medical care if:    · You have severe vaginal bleeding.     · You are dizzy or lightheaded, or you feel like you may faint.     · You have new or worse belly or pelvic pain.    Watch closely for changes in your health, and be sure to contact your doctor if:    · Your bleeding gets worse.     · You think you might be pregnant.     · You do not get better as expected. Where can you learn more? Go to http://tiago-jay.info/. Enter N304 in the search box to learn more about \"Vaginal Bleeding After Menopause: Care Instructions. \"  Current as of: May 14, 2018  Content Version: 11.9  © 9907-8675 Healthwise, Incorporated. Care instructions adapted under license by [employer] (which disclaims liability or warranty for this information).  If you have questions about a medical condition or this instruction, always ask your healthcare professional. [employer] disclaims any warranty or liability for your use of this information.

## 2019-03-11 NOTE — PROGRESS NOTES
Chief Complaint   Patient presents with    Vaginal Bleeding     Intermittent spotting since November 1. Have you been to the ER, urgent care clinic since your last visit? Hospitalized since your last visit? No    2. Have you seen or consulted any other health care providers outside of the [address] [place] [address] since your last visit? Include any pap smears or colon screening.  No

## 2019-03-11 NOTE — PROGRESS NOTES
Chief Complaint   Patient presents with    Vaginal Bleeding     Intermittent spotting since November       Pt is a 61y.o. year old female who presents for an acute visit for the above    Health Maintenance Due   Topic Date Due    Shingrix Vaccine Age 49> (1 of 2)-at her pharmacy 07/26/2005    COLONOSCOPY -hx of polyp-done by Dr Leggett/copy not in the chart 06/18/2016    Influenza Age 5 to Adult -at school in Oct 08/01/2018    [address] MAMMOGRAM -will do it in the summer she says 03/08/2019       Notes Recorded by Mora Carey DO on 3/21/2017 at 8:49 PM  ASCUS, POSITIVE HIGH RISK HPV, NEGATIVE 16/18/45 TYPES. Recommend:  Repeat pap smear in 3 years. Will see Dr Sondra Yin since Dr Goran Damon is no longer with the practice    Just spotting, not a real menstrual period sicne late last year-very light, brownish  Not sexually active    Osteoporosis-declines to do Dexa anymore   declines treatment still  Mom also had osteoporosis she says    Did not take Vit d rx-needs refill  Lab Results   Component Value Date/Time    VITAMIN D, 25-HYDROXY 20.6 (L) 04/24/2018 09:54 AM       Lab Results   Component Value Date/Time    Cholesterol, total 246 (H) 04/24/2018 09:54 AM    HDL Cholesterol 91 (H) 04/24/2018 09:54 AM    LDL, calculated 137 (H) 04/24/2018 09:54 AM    VLDL, calculated 19 04/24/2018 09:54 AM    Triglyceride 94 04/24/2018 09:54 AM   not fasting today    ROS:    Pt denies: Wt loss, Fever/Chills, HA, Visual changes, Fatigue, Chest pain, SOB, FRANKLIN, Abd pain, N/V/D/C, Blood in stool or urine, Edema. Pertinent positive as above in HPI.  All others were negative    Patient Active Problem List   Diagnosis Code    Vitamin D deficiency E55.9    Hyperlipidemia E78.5    Osteoporosis M81.0    Atypical squamous cells of undetermined significance on cytologic smear of cervix (ASC-US) R87.610    Dyspareunia, female N94.10       Past Medical History:   Diagnosis Date    Arthritis     ASCUS with positive high risk HPV 11/5/2013    Sees Gyn    Esophagitis 6/18/2013    Has been on Omeprazole for a while now    GERD (gastroesophageal reflux disease)     Hypercholesterolemia     PUD (peptic ulcer disease)     as a teenager       Current Outpatient Medications   Medication Sig Dispense Refill    co-enzyme Q-10 (CO Q-10) 100 mg capsule Take 100 mg by mouth daily.  multivitamin (ONE A DAY) tablet Take 1 Tab by mouth daily.  ergocalciferol (ERGOCALCIFEROL) 50,000 unit capsule Take 1 Cap by mouth every seven (7) days. 12 Cap 1       Social History     Tobacco Use   Smoking Status Never Smoker   Smokeless Tobacco Never Used       No Known Allergies    Patient Labs were reviewed: yes      Patient Past Records were reviewed:  yes        Objective:     Vitals:    03/11/19 1124   BP: 126/72   Pulse: 68   Resp: 15   Temp: 95.8 °F (35.4 °C)   TempSrc: Oral   SpO2: 96%   Weight: 91 lb 3.2 oz (41.4 kg)   Height: 4' 10\" (1.473 m)     Body mass index is 19.06 kg/m². Exam:   Appearance: alert, well appearing,  oriented to person, place, and time, acyanotic, in no respiratory distress and well hydrated. HEENT:  NC/AT, pink conj, anicteric sclerae, bilateral lens opacity  Neck:  No cervical lymphadenopathy, no JVD, no thyromegaly, no carotid bruit  Heart:  RRR without M/R/G  Lungs:  CTAB, no rhonchi, rales, or wheezes with good air exchange   Abdomen:  Non-tender, pos bowel sounds, no hepatosplenomegaly  Ext:  No C/C/E    Skin: no rash  Neuro: no lateralizing signs, CNs II-XII intact      Assessment/ Plan:   Diagnoses and all orders for this visit:    1. Postmenopausal bleeding-advised she needs to see Gyn for this  -     US PELV NON OBS; Future  -     REFERRAL TO OBSTETRICS AND GYNECOLOGY    2. Osteoporosis without current pathological fracture, unspecified osteoporosis type-declines treatment and further Dexa    3.  Vitamin D deficiency-compliance with rx advised  -     ergocalciferol (ERGOCALCIFEROL) 50,000 unit capsule; Take 1 Cap by mouth every seven (7) days. 4. Hyperlipidemia, unspecified hyperlipidemia type-HDL elevated, continue to watch diet    5. Cataract of both eyes, unspecified cataract type  -     REFERRAL TO OPHTHALMOLOGY    6. Bilateral dry eyes  -     REFERRAL TO OPHTHALMOLOGY        Follow-up Disposition:  Return in about 3 months (around 6/11/2019) for physical.        I have discussed the diagnosis with the patient and the intended plan as seen in the above orders. The patient has received an After-Visit Summary and questions were answered concerning future plans. Medication Side Effects and Warnings were discussed with patient: yes    Patient verbalized understanding of above instructions.     Douglas Florez MD  Internal Medicine  [address] [facility]

## 2019-03-25 ENCOUNTER — HOSPITAL ENCOUNTER (OUTPATIENT)
Dept: ULTRASOUND IMAGING | Age: 64
Discharge: HOME | End: 2019-03-25
Attending: INTERNAL MEDICINE
Payer: COMMERCIAL

## 2019-03-25 DIAGNOSIS — N95.0 POSTMENOPAUSAL BLEEDING: ICD-10-CM

## 2019-03-25 PROCEDURE — 76856 US EXAM PELVIC COMPLETE: CPT

## 2019-06-04 ENCOUNTER — DOCUMENTATION ONLY (OUTPATIENT)
Dept: FAMILY MEDICINE CLINIC | Age: 64
End: 2019-06-04

## 2019-09-19 ENCOUNTER — OFFICE VISIT (OUTPATIENT)
Dept: FAMILY MEDICINE CLINIC | Age: 64
End: 2019-09-19

## 2019-09-19 VITALS
RESPIRATION RATE: 18 BRPM | HEART RATE: 51 BPM | SYSTOLIC BLOOD PRESSURE: 120 MMHG | DIASTOLIC BLOOD PRESSURE: 54 MMHG | BODY MASS INDEX: 20.45 KG/M2 | TEMPERATURE: 96.1 F | HEIGHT: 58 IN | WEIGHT: 97.4 LBS | OXYGEN SATURATION: 98 %

## 2019-09-19 DIAGNOSIS — Z23 ENCOUNTER FOR IMMUNIZATION: ICD-10-CM

## 2019-09-19 DIAGNOSIS — M81.0 OSTEOPOROSIS WITHOUT CURRENT PATHOLOGICAL FRACTURE, UNSPECIFIED OSTEOPOROSIS TYPE: ICD-10-CM

## 2019-09-19 DIAGNOSIS — Z00.00 WELL WOMAN EXAM (NO GYNECOLOGICAL EXAM): Primary | ICD-10-CM

## 2019-09-19 DIAGNOSIS — R21 RASH OF GENITAL AREA: ICD-10-CM

## 2019-09-19 DIAGNOSIS — E55.9 VITAMIN D DEFICIENCY: ICD-10-CM

## 2019-09-19 DIAGNOSIS — Z12.39 SCREENING FOR BREAST CANCER: ICD-10-CM

## 2019-09-19 DIAGNOSIS — L73.9 FOLLICULITIS: ICD-10-CM

## 2019-09-19 RX ORDER — DOXYCYCLINE 100 MG/1
100 CAPSULE ORAL 2 TIMES DAILY
Qty: 14 CAP | Refills: 0 | Status: SHIPPED | OUTPATIENT
Start: 2019-09-19 | End: 2019-09-26

## 2019-09-19 NOTE — PROGRESS NOTES
Chief Complaint   Patient presents with    Physical     Patient is fasting    Immunization/Injection     Influenza vaccine     1. Have you been to the ER, urgent care clinic since your last visit? Hospitalized since your last visit? No    2. Have you seen or consulted any other health care providers outside of the [facility] since your last visit? Include any pap smears or colon screening. No         Dianefab Crum is a 59 y.o. female who presents for routine immunizations. She denies any symptoms , reactions or allergies that would exclude them from being immunized today. Risks and adverse reactions were discussed and the VIS was given to them. All questions were addressed. She was observed for 20 min post injection. There were no reactions observed.     Stefania Richard LPN

## 2019-09-19 NOTE — PROGRESS NOTES
Chief Complaint   Patient presents with    Physical     Patient is fasting    Immunization/Injection     Influenza vaccine       Pt is a 59y.o. year old female who presents for her annual wellness visit    Health Maintenance Due   Topic Date Due    Shingrix Vaccine Age 49> (1 of 2)-at her pharmacy 07/26/2005    COLONOSCOPY  08/11/2016    BREAST CANCER SCRN MAMMOGRAM  03/08/2019    Influenza Age 5 to Adult -today 08/01/2019     Fasting today for labs    Had tubular adenoma-advised to get follow up colonoscopy-wants to do it next year    [facility] mamm[facility]    Wt Readings from Last 3 Encounters:   09/19/19 97 lb 6.4 oz (44.2 kg)   03/11/19 91 lb 3.2 oz (41.4 kg)   04/24/18 91 lb (41.3 kg)       Osteoporosis-declines Prolia  Scared of joint pains as side effect    Spot on the vaginal area since age 5-dry/burning when she puts something; pinkish  Gyn did not see it/not bothering her then when she was seen    2020 for repeat PAP with Gyn-positive HPV    Lab Results   Component Value Date/Time    VITAMIN D, 25-HYDROXY 30.0 (L) 09/19/2019 11:54 AM     S/p rx for 3 months    Total cholesterol during screening in May at work place went down to 220  HDL is very high    ROS:    Pt denies: Wt loss, Fever/Chills, HA, Visual changes, Fatigue, Chest pain, SOB, FRANKLIN, Abd pain, N/V/D/C, Blood in stool or urine, Edema. Pertinent positive as above in HPI.  All others were negative    Patient Active Problem List   Diagnosis Code    Vitamin D deficiency E55.9    Hyperlipidemia E78.5    Osteoporosis M81.0    Atypical squamous cells of undetermined significance on cytologic smear of cervix (ASC-US) R87.610    Dyspareunia, female N94.10       Past Medical History:   Diagnosis Date    Arthritis     ASCUS with positive high risk HPV 11/5/2013    Sees Gyn    Esophagitis 6/18/2013    Has been on Omeprazole for a while now    GERD (gastroesophageal reflux disease)     Hypercholesterolemia     PUD (peptic ulcer disease)     as a teenager       Current Outpatient Medications   Medication Sig Dispense Refill    doxycycline (VIBRAMYCIN) 100 mg capsule Take 1 Cap by mouth two (2) times a day for 7 days. 14 Cap 0    co-enzyme Q-10 (CO Q-10) 100 mg capsule Take 100 mg by mouth daily.  multivitamin (ONE A DAY) tablet Take 1 Tab by mouth daily. Social History     Tobacco Use   Smoking Status Never Smoker   Smokeless Tobacco Never Used       No Known Allergies    Patient Labs were reviewed: yes      Patient Past Records were reviewed:  yes        Objective:     Vitals:    09/19/19 1204   BP: 120/54   Pulse: (!) 51   Resp: 18   Temp: 96.1 °F (35.6 °C)   TempSrc: Oral   SpO2: 98%   Weight: 97 lb 6.4 oz (44.2 kg)   Height: 4' 10\" (1.473 m)     Body mass index is 20.36 kg/m². Exam:   Appearance: alert, well appearing,  oriented to person, place, and time, acyanotic, in no respiratory distress and well hydrated. HEENT:  NC/AT, pink conj, anicteric sclerae  Neck:  No cervical lymphadenopathy, no JVD, no thyromegaly, no carotid bruit  Heart:  RRR without M/R/G  Lungs:  CTAB, no rhonchi, rales, or wheezes with good air exchange   Abdomen:  Non-tender, pos bowel sounds, no hepatosplenomegaly  Ext:  No C/C/E    Skin: no rash  Neuro: no lateralizing signs, CNs II-XII intact  Breast exam: no palpable masses bilaterally, no nipple discharge, no axillary adenopathy, no skin changes  Genital exam: area on the left labia that has a subcutaneous pea sized mass that is tender to touch and around the area is an erythematous hyperpigmented area  Assessment/ Plan:   Diagnoses and all orders for this visit:    1. Well woman exam (no gynecological exam)-Advised re: monthly self breast exam, dental prophylaxis Q 6 months, regular exercise, yearly eye exam, daily intake of Ca+D  -     CBC WITH AUTOMATED DIFF; Future  -     METABOLIC PANEL, COMPREHENSIVE; Future  -     LIPID PANEL;  Future  -     ELEAZAR MAMMO BI SCREENING INCL CAD; Future    2. Vitamin D deficiency -s/p rx  -     VITAMIN D, 25 HYDROXY; Future    3. Folliculitis  -     doxycycline (VIBRAMYCIN) 100 mg capsule; Take 1 Cap by mouth two (2) times a day for 7 days. 4. Rash of genital area-etio?  -     REFERRAL TO DERMATOLOGY    5. Osteoporosis without current pathological fracture, unspecified osteoporosis type-declines tx    6. Screening for breast cancer  -     [facility] MAMMO BI SCREENING INCL CAD; Future    7. Encounter for immunization  -     INFLUENZA VIRUS VAC QUAD,SPLIT,PRESV FREE SYRINGE IM        Follow-up and Dispositions    · Return in about 1 year (around 9/19/2020) for physical.             I have discussed the diagnosis with the patient and the intended plan as seen in the above orders. The patient has received an After-Visit Summary and questions were answered concerning future plans. Medication Side Effects and Warnings were discussed with patient: yes    Patient verbalized understanding of above instructions.     Paresh Duron MD  Internal Medicine  [facility]

## 2019-09-19 NOTE — PATIENT INSTRUCTIONS
Well Visit, Women 48 to 72: Care Instructions  Your Care Instructions    Physical exams can help you stay healthy. Your doctor has checked your overall health and may have suggested ways to take good care of yourself. He or she also may have recommended tests. At home, you can help prevent illness with healthy eating, regular exercise, and other steps. Follow-up care is a key part of your treatment and safety. Be sure to make and go to all appointments, and call your doctor if you are having problems. It's also a good idea to know your test results and keep a list of the medicines you take. How can you care for yourself at home? · Reach and stay at a healthy weight. This will lower your risk for many problems, such as obesity, diabetes, heart disease, and high blood pressure. · Get at least 30 minutes of exercise on most days of the week. Walking is a good choice. You also may want to do other activities, such as running, swimming, cycling, or playing tennis or team sports. · Do not smoke. Smoking can make health problems worse. If you need help quitting, talk to your doctor about stop-smoking programs and medicines. These can increase your chances of quitting for good. · Protect your skin from too much sun. When you're outdoors from 10 a.m. to 4 p.m., stay in the shade or cover up with clothing and a hat with a wide brim. Wear sunglasses that block UV rays. Even when it's cloudy, put broad-spectrum sunscreen (SPF 30 or higher) on any exposed skin. · See a dentist one or two times a year for checkups and to have your teeth cleaned. · Wear a seat belt in the car. Follow your doctor's advice about when to have certain tests. These tests can spot problems early. · Cholesterol. Your doctor will tell you how often to have this done based on your age, family history, or other things that can increase your risk for heart attack and stroke. · Blood pressure.  Have your blood pressure checked during a routine doctor visit. Your doctor will tell you how often to check your blood pressure based on your age, your blood pressure results, and other factors. · Mammogram. Ask your doctor how often you should have a mammogram, which is an X-ray of your breasts. A mammogram can spot breast cancer before it can be felt and when it is easiest to treat. · Pap test and pelvic exam. Ask your doctor how often you should have a Pap test. You may not need to have a Pap test as often as you used to. · Vision. Have your eyes checked every year or two or as often as your doctor suggests. Some experts recommend that you have yearly exams for glaucoma and other age-related eye problems starting at age 48. · Hearing. Tell your doctor if you notice any change in your hearing. You can have tests to find out how well you hear. · Diabetes. Ask your doctor whether you should have tests for diabetes. · Colorectal cancer. Your risk for colorectal cancer gets higher as you get older. Some experts say that adults should start regular screening at age 48 and stop at age 76. Others say to start before age 48 or continue after age 76. Talk with your doctor about your risk and when to start and stop screening. · Thyroid disease. Talk to your doctor about whether to have your thyroid checked as part of a regular physical exam. Women have an increased chance of a thyroid problem. · Osteoporosis. You should begin tests for bone density at age 72. If you are younger than 72, ask your doctor whether you have factors that may increase your risk for this disease. You may want to have this test before age 72. · Heart attack and stroke risk. At least every 4 to 6 years, you should have your risk for heart attack and stroke assessed. Your doctor uses factors such as your age, blood pressure, cholesterol, and whether you smoke or have diabetes to show what your risk for a heart attack or stroke is over the next 10 years.   When should you call for help?  Watch closely for changes in your health, and be sure to contact your doctor if you have any problems or symptoms that concern you. Where can you learn more? Go to http://tiago-jay.info/. Enter M221 in the search box to learn more about \"Well Visit, Women 50 to 72: Care Instructions. \"  Current as of: December 13, 2018  Content Version: 12.1  © 0525-4796 [employer]. Care instructions adapted under license by [employer] (which disclaims liability or warranty for this information). If you have questions about a medical condition or this instruction, always ask your healthcare professional. [place] [address] any warranty or liability for your use of this information. Folliculitis: Care Instructions  Your Care Instructions    Folliculitis (say \"jkn-ETE-enu-LY-tus\") is an infection of the pouches (follicles) in the skin where hair grows. It can occur on any part of the body, but it is most common on the scalp, face, armpits, and groin. Bacteria, such as those found in a hot tub, can cause folliculitis. Folliculitis begins as a red, tender area near a strand of hair. The skin can itch or burn and may drain pus or blood. Sometimes folliculitis can lead to more serious skin infections. Your doctor usually can treat mild folliculitis with an antibiotic cream or ointment. If you have folliculitis on your scalp, you may use a shampoo that kills bacteria. Antibiotics you take as pills can treat infections deeper in the skin. For stubborn cases of folliculitis, laser treatment may be an option. Laser treatment uses strong beams of light to destroy the hair follicle. But hair will no longer grow in the treated area. Follow-up care is a key part of your treatment and safety. Be sure to make and go to all appointments, and call your doctor if you are having problems.  It's also a good idea to know your test results and keep a list of the medicines you take. How can you care for yourself at home? · Take your medicine exactly as prescribed. If your doctor prescribed antibiotics, take them as directed. Do not stop taking them just because you feel better. You need to take the full course of antibiotics. · Use a soap that kills bacteria to wash the infected area. If your scalp or beard is infected, use a shampoo with selenium or propylene glycol. Be careful. Do not scrub too long or too hard. · Mix 1 1/3 cup warm water and 1 tablespoon vinegar. Soak a cloth in the mixture, and place it over the infected skin until it cools off (usually 5 to 10 minutes). You can do this 3 to 6 times a day. · Do not share your razor, towel, or washcloth. That can spread folliculitis. · Use a new blade in your razor each time you shave to keep from re-infecting your skin. · If you tend to get folliculitis, avoid using hot tubs. They can contain bacteria that cause folliculitis. When should you call for help? Call your doctor now or seek immediate medical care if:    · You have symptoms of infection, such as:  ? Increased pain, swelling, warmth, or redness. ? Red streaks leading from the area. ? Pus draining from the area. ? A fever.    Watch closely for changes in your health, and be sure to contact your doctor if:    · You do not get better as expected. Where can you learn more? Go to http://tiago-jay.info/. Enter M257 in the search box to learn more about \"Folliculitis: Care Instructions. \"  Current as of: April 1, 2019  Content Version: 12.1  © 3510-4376 [employer]. Care instructions adapted under license by [employer] (which disclaims liability or warranty for this information). If you have questions about a medical condition or this instruction, always ask your healthcare professional. [address] any warranty or liability for your use of this information.

## 2019-09-20 LAB
25(OH)D3 SERPL-MCNC: 30 NG/ML (ref 32–100)
A-G RATIO,AGRAT: 1.7 RATIO (ref 1.1–2.6)
ABSOLUTE LYMPHOCYTE COUNT, 10803: 1.3 K/UL (ref 1–4.8)
ALBUMIN SERPL-MCNC: 4.7 G/DL (ref 3.5–5)
ALP SERPL-CCNC: 61 U/L (ref 40–120)
ALT SERPL-CCNC: 14 U/L (ref 5–40)
ANION GAP SERPL CALC-SCNC: 13 MMOL/L
AST SERPL W P-5'-P-CCNC: 22 U/L (ref 10–37)
BASOPHILS # BLD: 0.1 K/UL (ref 0–0.2)
BASOPHILS NFR BLD: 1 % (ref 0–2)
BILIRUB SERPL-MCNC: 0.6 MG/DL (ref 0.2–1.2)
BUN SERPL-MCNC: 13 MG/DL (ref 6–22)
CALCIUM SERPL-MCNC: 9.6 MG/DL (ref 8.4–10.5)
CHLORIDE SERPL-SCNC: 99 MMOL/L (ref 98–110)
CHOLEST SERPL-MCNC: 225 MG/DL (ref 110–200)
CO2 SERPL-SCNC: 28 MMOL/L (ref 20–32)
CREAT SERPL-MCNC: 0.5 MG/DL (ref 0.8–1.4)
EOSINOPHIL # BLD: 0 K/UL (ref 0–0.5)
EOSINOPHIL NFR BLD: 1 % (ref 0–6)
ERYTHROCYTE [DISTWIDTH] IN BLOOD BY AUTOMATED COUNT: 13 % (ref 10–15.5)
GFRAA, 66117: >60
GFRNA, 66118: >60
GLOBULIN,GLOB: 2.7 G/DL (ref 2–4)
GLUCOSE SERPL-MCNC: 86 MG/DL (ref 70–99)
GRANULOCYTES,GRANS: 56 % (ref 40–75)
HCT VFR BLD AUTO: 39.1 % (ref 35.1–48)
HDLC SERPL-MCNC: 2.3 MG/DL (ref 0–5)
HDLC SERPL-MCNC: 97 MG/DL (ref 40–59)
HGB BLD-MCNC: 12.9 G/DL (ref 11.7–16)
LDLC SERPL CALC-MCNC: 117 MG/DL (ref 50–99)
LYMPHOCYTES, LYMLT: 35 % (ref 20–45)
MCH RBC QN AUTO: 29 PG (ref 26–34)
MCHC RBC AUTO-ENTMCNC: 33 G/DL (ref 31–36)
MCV RBC AUTO: 89 FL (ref 80–95)
MONOCYTES # BLD: 0.3 K/UL (ref 0.1–1)
MONOCYTES NFR BLD: 7 % (ref 3–12)
NEUTROPHILS # BLD AUTO: 2 K/UL (ref 1.8–7.7)
PLATELET # BLD AUTO: 213 K/UL (ref 140–440)
PMV BLD AUTO: 10 FL (ref 9–13)
POTASSIUM SERPL-SCNC: 4.3 MMOL/L (ref 3.5–5.5)
PROT SERPL-MCNC: 7.4 G/DL (ref 6.2–8.1)
RBC # BLD AUTO: 4.39 M/UL (ref 3.8–5.2)
SODIUM SERPL-SCNC: 140 MMOL/L (ref 133–145)
TRIGL SERPL-MCNC: 54 MG/DL (ref 40–149)
VLDLC SERPL CALC-MCNC: 11 MG/DL (ref 8–30)
WBC # BLD AUTO: 3.6 K/UL (ref 4–11)

## 2020-02-26 ENCOUNTER — OFFICE VISIT (OUTPATIENT)
Dept: FAMILY MEDICINE CLINIC | Age: 65
End: 2020-02-26

## 2020-02-26 VITALS
TEMPERATURE: 97.3 F | RESPIRATION RATE: 15 BRPM | HEIGHT: 58 IN | WEIGHT: 90.4 LBS | HEART RATE: 67 BPM | BODY MASS INDEX: 18.97 KG/M2 | SYSTOLIC BLOOD PRESSURE: 127 MMHG | DIASTOLIC BLOOD PRESSURE: 57 MMHG | OXYGEN SATURATION: 99 %

## 2020-02-26 DIAGNOSIS — M81.0 OSTEOPOROSIS WITHOUT CURRENT PATHOLOGICAL FRACTURE, UNSPECIFIED OSTEOPOROSIS TYPE: ICD-10-CM

## 2020-02-26 DIAGNOSIS — E78.5 HYPERLIPIDEMIA, UNSPECIFIED HYPERLIPIDEMIA TYPE: ICD-10-CM

## 2020-02-26 DIAGNOSIS — C44.99 EXTRAMAMMARY PAGET'S DISEASE: Primary | ICD-10-CM

## 2020-02-26 DIAGNOSIS — E55.9 VITAMIN D DEFICIENCY: ICD-10-CM

## 2020-02-26 NOTE — PROGRESS NOTES
Chief Complaint   Patient presents with    Follow Up Chronic Condition     6 month     1. Have you been to the ER, urgent care clinic since your last visit? Hospitalized since your last visit? Yes Where: Surgery    2. Have you seen or consulted any other health care providers outside of the [facility] since your last visit? Include any pap smears or colon screening.  No

## 2020-02-26 NOTE — PATIENT INSTRUCTIONS
Preventing Osteoporosis: Care Instructions  Your Care Instructions    Osteoporosis means the bones are weak and thin enough that they can break easily. The older you are, the more likely you are to get osteoporosis. But with plenty of calcium, vitamin D, and exercise, you can help prevent osteoporosis. The preteen and teen years are a key time for bone building. With the help of calcium, vitamin D, and exercise in those early years and beyond, the bones reach their peak density and strength by age 27. After age 27, your bones naturally start to thin and weaken. The stronger your bones are at around age 27, the lower your risk for osteoporosis. But no matter what your age and risk are, your bones still need calcium, vitamin D, and exercise to stay strong. Also avoid smoking, and limit alcohol. Smoking and heavy alcohol use can make your bones thinner. Talk to your doctor about any special risks you might have, such as having a close relative with osteoporosis or taking a medicine that can weaken bones. Your doctor can tell you the best ways to protect your bones from thinning. Follow-up care is a key part of your treatment and safety. Be sure to make and go to all appointments, and call your doctor if you are having problems. It's also a good idea to know your test results and keep a list of the medicines you take. How can you care for yourself at home? · Get enough calcium and vitamin D. The [place] of Medicine recommends adults younger than age 46 need 1,000 mg of calcium and 600 IU of vitamin D each day. Women ages 46 to 79 need 1,200 mg of calcium and 600 IU of vitamin D each day. Men ages 46 to 79 need 1,000 mg of calcium and 600 IU of vitamin D each day. Adults 71 and older need 1,200 mg of calcium and 800 IU of vitamin D each day. ? Eat foods rich in calcium, like yogurt, cheese, milk, and dark green vegetables.   ? Eat foods rich in vitamin D, like eggs, fatty fish, cereal, and fortified milk.  ? Get some sunshine. Your body uses sunshine to make its own vitamin D. The safest time to be out in the sun is before 10 a.m. or after 3 p.m. Avoid getting sunburned. Sunburn can increase your risk of skin cancer. ? Talk to your doctor about taking a calcium plus vitamin D supplement. Ask about what type of calcium is right for you, and how much to take at a time. Adults ages 23 to 48 should not get more than 2,500 mg of calcium and 4,000 IU of vitamin D each day, whether it is from supplements and/or food. Adults ages 46 and older should not get more than 2,000 mg of calcium and 4,000 IU of vitamin D each day from supplements and/or food. · Get regular bone-building exercise. Weight-bearing and resistance exercises keep bones healthy by working the muscles and bones against gravity. Start out at an exercise level that feels right for you. Add a little at a time until you can do the following:  ? Do 30 minutes of weight-bearing exercise on most days of the week. Walking, jogging, stair climbing, and dancing are good choices. ? Do resistance exercises with weights or elastic bands 2 to 3 days a week. · Limit alcohol. Drink no more than 1 alcohol drink a day if you are a woman. Drink no more than 2 alcohol drinks a day if you are a man. · Do not smoke. Smoking can make bones thin faster. If you need help quitting, talk to your doctor about stop-smoking programs and medicines. These can increase your chances of quitting for good. When should you call for help? Watch closely for changes in your health, and be sure to contact your doctor if you have any problems. Where can you learn more? Go to http://tiago-jay.info/. Enter T677 in the search box to learn more about \"Preventing Osteoporosis: Care Instructions. \"  Current as of: November 7, 2018  Content Version: 12.2  © 2273-4735 [employer].  Care instructions adapted under license by [employer] (which disclaims liability or warranty for this information). If you have questions about a medical condition or this instruction, always ask your healthcare professional. [address] any warranty or liability for your use of this information.

## 2020-02-26 NOTE — PROGRESS NOTES
Chief Complaint   Patient presents with    Follow Up Chronic Condition     6 month       Pt is a 59y.o. year old female who presents for follow up of her chronic medical problems    Health Maintenance Due   Topic Date Due    Colonoscopy -hx of polyp 08/11/2016    Breast Cancer Screen Mammogram -March appt 03/08/2019    PAP AKA CERVICAL CYTOLOGY -due 2020 03/10/2020     Had surgery Feb7-left vulvectomy for Paget's disease extrammamary  Already ahd post op follow up  Will need another surgery bec margins are positive    Lab Results   Component Value Date/Time    Cholesterol, total 225 (H) 09/19/2019 11:54 AM    HDL Cholesterol 97 (H) 09/19/2019 11:54 AM    LDL, calculated 117 (H) 09/19/2019 11:54 AM    VLDL, calculated 11 09/19/2019 11:54 AM    Triglyceride 54 09/19/2019 11:54 AM   fasting today    Osteoporosis-declines    Lab Results   Component Value Date/Time    VITAMIN D, 25-HYDROXY 30.0 (L) 09/19/2019 11:54 AM     S/p vit d rx, not on OTC Ca+D-read something bad about it  Will get it from her diet      ROS:    Pt denies: Wt loss, Fever/Chills, HA, Visual changes, Fatigue, Chest pain, SOB, FRANKLIN, Abd pain, N/V/D/C, Blood in stool or urine, Edema. Pertinent positive as above in HPI.  All others were negative    Patient Active Problem List   Diagnosis Code    Vitamin D deficiency E55.9    Hyperlipidemia E78.5    Osteoporosis M81.0    Atypical squamous cells of undetermined significance on cytologic smear of cervix (ASC-US) R87.610    Dyspareunia, female N94.10    Extramammary Paget's disease C44.99       Past Medical History:   Diagnosis Date    Arthritis     ASCUS with positive high risk HPV 11/5/2013    Sees Gyn    Esophagitis 6/18/2013    Has been on Omeprazole for a while now    Extramammary Paget's disease 2/26/2020    GERD (gastroesophageal reflux disease)     Hypercholesterolemia     PUD (peptic ulcer disease)     as a teenager       Current Outpatient Medications   Medication Sig Dispense Refill    co-enzyme Q-10 (CO Q-10) 100 mg capsule Take 100 mg by mouth daily.  multivitamin (ONE A DAY) tablet Take 1 Tab by mouth daily. Social History     Tobacco Use   Smoking Status Never Smoker   Smokeless Tobacco Never Used       No Known Allergies    Patient Labs were reviewed: yes      Patient Past Records were reviewed:  yes        Objective:     Vitals:    02/26/20 0853   BP: 127/57   Pulse: 67   Resp: 15   Temp: 97.3 °F (36.3 °C)   TempSrc: Oral   SpO2: 99%   Weight: 90 lb 6.4 oz (41 kg)   Height: 4' 10\" (1.473 m)     Body mass index is 18.89 kg/m². Exam:   Appearance: alert, well appearing,  oriented to person, place, and time, acyanotic, in no respiratory distress and well hydrated. HEENT:  NC/AT, pink conj, anicteric sclerae  Neck:  No cervical lymphadenopathy, no JVD, no thyromegaly, no carotid bruit  Heart:  RRR without M/R/G  Lungs:  CTAB, no rhonchi, rales, or wheezes with good air exchange   Abdomen:  Non-tender, pos bowel sounds, no hepatosplenomegaly  Ext:  No C/C/E    Skin: no rash  Neuro: no lateralizing signs, CNs II-XII intact  Pelvic exam: surgical site is well healed    Assessment/ Plan:   Diagnoses and all orders for this visit:    1. Extramammary Paget's disease-for re-excision bec margins were positive; Dr Radha Sandy following    2. Osteoporosis without current pathological fracture, unspecified osteoporosis type-declines tx    3. Hyperlipidemia, unspecified hyperlipidemia type-HDL up so continue to watch diet    4. Vitamin D deficiency-on OTC vit D        Follow-up and Dispositions    · Return in about 6 months (around 8/26/2020) for annual wellness. I have discussed the diagnosis with the patient and the intended plan as seen in the above orders. The patient has received an After-Visit Summary and questions were answered concerning future plans.      Medication Side Effects and Warnings were discussed with patient: yes    Patient verbalized understanding of above instructions.     Matt Brooks MD  Internal Medicine  [facility]

## 2020-07-29 ENCOUNTER — VIRTUAL VISIT (OUTPATIENT)
Dept: FAMILY MEDICINE CLINIC | Age: 65
End: 2020-07-29

## 2020-07-29 DIAGNOSIS — C44.99 EXTRAMAMMARY PAGET'S DISEASE: Primary | ICD-10-CM

## 2020-07-29 DIAGNOSIS — E78.5 HYPERLIPIDEMIA, UNSPECIFIED HYPERLIPIDEMIA TYPE: ICD-10-CM

## 2020-07-29 DIAGNOSIS — E55.9 VITAMIN D DEFICIENCY: ICD-10-CM

## 2020-07-29 DIAGNOSIS — Z12.39 SCREENING FOR BREAST CANCER: ICD-10-CM

## 2020-07-29 DIAGNOSIS — M81.0 OSTEOPOROSIS WITHOUT CURRENT PATHOLOGICAL FRACTURE, UNSPECIFIED OSTEOPOROSIS TYPE: ICD-10-CM

## 2020-07-29 DIAGNOSIS — D12.6 TUBULAR ADENOMA OF COLON: ICD-10-CM

## 2020-07-29 DIAGNOSIS — R87.610 ATYPICAL SQUAMOUS CELLS OF UNDETERMINED SIGNIFICANCE ON CYTOLOGIC SMEAR OF CERVIX (ASC-US): ICD-10-CM

## 2020-07-29 NOTE — Clinical Note
Pls let patient know I cancelled cologuard bec she had a polyp on her previous colonoscopy so I sent her referral to GI

## 2020-07-29 NOTE — PROGRESS NOTES
Vamsi Joy is a 72 y.o. female who was seen by synchronous (real-time) audio-video technology on 7/29/2020 for Follow Up Chronic Condition (Cholesterol, Osteoporisis)        Assessment & Plan:   Diagnoses and all orders for this visit:    1. Extramammary Paget's disease-s/p excision and repair    2. Osteoporosis without current pathological fracture, unspecified osteoporosis type-declines tx; discussed daily CA+D, walk for exercise    3. Hyperlipidemia, unspecified hyperlipidemia type-HDL elev so no treatment needed, just low cholesterol diet    4. Vitamin D deficiency-declined to take Vit D rx or OTC; discussed Vit D rich foods    5. Tubular adenoma of colon-advised she needs follow up colonoscopy  -     REFERRAL TO GASTROENTEROLOGY    6. Atypical squamous cells of undetermined significance on cytologic smear of cervix (ASC-US) with positive HPV high risk  -     REFERRAL TO GYNECOLOGY for follow up PAP    7. Screening for breast cancer  -     ELEAZAR MAMMO BI SCREENING INCL CAD;  Future        Follow-up and Dispositions    · Return in about 3 months (around 10/29/2020) for physical.       712  Subjective:     Health Maintenance Due   Topic Date Due    Colonoscopy -no sxs, no family hx of colon cancer-will do cologuard 08/11/2016    Breast Cancer Screen Mammogram -Hernan Milks 03/08/2019    PAP AKA CERVICAL CYTOLOGY -will see Dr Carlos Eduardo Sotelo since Dr Wale Adler is no longer with the practice 03/10/2020    GLAUCOMA SCREENING Q2Y -wears eyeglasses/[facility]; used to go to [facility]-told cataracts 07/26/2020    Pneumococcal 65+ years (1 of 1 - PPSV23)-next visit 07/26/2020     Had another surgery July 17th with Dr Angle Castro path report said there were still cancer cells on the margins-healing  Also saw Plastic surgery-neg margins    Lab Results   Component Value Date/Time    Cholesterol, total 225 (H) 09/19/2019 11:54 AM    HDL Cholesterol 97 (H) 09/19/2019 11:54 AM    LDL, calculated 117 (H) 09/19/2019 11:54 AM    VLDL, calculated 11 09/19/2019 11:54 AM    Triglyceride 54 09/19/2019 11:54 AM       BP Readings from Last 3 Encounters:   02/26/20 127/57   09/19/19 120/54   03/11/19 126/72   130/69 at home      Still declines tx for osteoporosis-will think about it she says    Lab Results   Component Value Date/Time    VITAMIN D, 25-HYDROXY 30.0 (L) 09/19/2019 11:54 AM       Prior to Admission medications    Medication Sig Start Date End Date Taking? Authorizing Provider   ascorbic acid (VITAMIN C PO) Take  by mouth. Yes Provider, Historical   multivitamin (ONE A DAY) tablet Take 1 Tab by mouth daily. Yes Provider, Historical   co-enzyme Q-10 (CO Q-10) 100 mg capsule Take 100 mg by mouth daily. Provider, Historical     Patient Active Problem List    Diagnosis Date Noted    Extramammary Paget's disease 02/26/2020    Dyspareunia, female 02/12/2016    Atypical squamous cells of undetermined significance on cytologic smear of cervix (ASC-US) 11/05/2013    Osteoporosis 07/01/2013    Vitamin D deficiency 06/18/2013    Hyperlipidemia 06/18/2013       ROS  Pt denies: Wt loss, Fever/Chills, HA, Visual changes, Fatigue, Chest pain, SOB, FRANKLIN, Abd pain, N/V/D/C, Blood in stool or urine, Edema. Pertinent positive as above in HPI. All others were negative  Objective:   No flowsheet data found. General: alert, cooperative, no distress   Mental  status: normal mood, behavior, speech, dress, motor activity, and thought processes, able to follow commands   HENT: NCAT   Neck: no visualized mass   Resp: no respiratory distress   Neuro: no gross deficits   Skin: no discoloration or lesions of concern on visible areas   Psychiatric: normal affect, consistent with stated mood, no evidence of hallucinations     Additional exam findings: none      We discussed the expected course, resolution and complications of the diagnosis(es) in detail.   Medication risks, benefits, costs, interactions, and alternatives were discussed as indicated. I advised her to contact the office if her condition worsens, changes or fails to improve as anticipated. She expressed understanding with the diagnosis(es) and plan. Luis You, who was evaluated through a patient-initiated, synchronous (real-time) audio-video encounter, and/or her healthcare decision maker, is aware that it is a billable service, with coverage as determined by her insurance carrier. She provided verbal consent to proceed: Yes, and patient identification was verified. It was conducted pursuant to the emergency declaration under the [address] [place] [address], [address] [place] [address] authority and the [employer] General Act. A caregiver was present when appropriate. Ability to conduct physical exam was limited. I was at home. The patient was at home.       Riley Lama MD

## 2020-07-29 NOTE — PATIENT INSTRUCTIONS
Preventing Osteoporosis: Care Instructions Your Care Instructions Osteoporosis means the bones are weak and thin enough that they can break easily. The older you are, the more likely you are to get osteoporosis. But with plenty of calcium, vitamin D, and exercise, you can help prevent osteoporosis. The preteen and teen years are a key time for bone building. With the help of calcium, vitamin D, and exercise in those early years and beyond, the bones reach their peak density and strength by age 27. After age 27, your bones naturally start to thin and weaken. The stronger your bones are at around age 27, the lower your risk for osteoporosis. But no matter what your age and risk are, your bones still need calcium, vitamin D, and exercise to stay strong. Also avoid smoking, and limit alcohol. Smoking and heavy alcohol use can make your bones thinner. Talk to your doctor about any special risks you might have, such as having a close relative with osteoporosis or taking a medicine that can weaken bones. Your doctor can tell you the best ways to protect your bones from thinning. Follow-up care is a key part of your treatment and safety. Be sure to make and go to all appointments, and call your doctor if you are having problems. It's also a good idea to know your test results and keep a list of the medicines you take. How can you care for yourself at home? · Get enough calcium and vitamin D. The [place] [employer] recommends adults younger than age 46 need 1,000 mg of calcium and 600 IU of vitamin D each day. Women ages 46 to 79 need 1,200 mg of calcium and 600 IU of vitamin D each day. Men ages 46 to 79 need 1,000 mg of calcium and 600 IU of vitamin D each day. Adults 71 and older need 1,200 mg of calcium and 800 IU of vitamin D each day. ? Eat foods rich in calcium, like yogurt, cheese, milk, and dark green vegetables. ? Eat foods rich in vitamin D, like eggs, fatty fish, cereal, and fortified milk. ? Get some sunshine. Your body uses sunshine to make its own vitamin D. The safest time to be out in the sun is before 10 a.m. or after 3 p.m. Avoid getting sunburned. Sunburn can increase your risk of skin cancer. ? Talk to your doctor about taking a calcium plus vitamin D supplement if you don't think you are getting enough through your diet and sunshine. Ask about what type of calcium is right for you, and how much to take at a time. Adults ages 23 to 48 should not get more than 2,500 mg of calcium and 4,000 IU of vitamin D each day, whether it is from supplements and/or food. Adults ages 46 and older should not get more than 2,000 mg of calcium and 4,000 IU of vitamin D each day from supplements and/or food. · Get regular bone-building exercise. Weight-bearing and resistance exercises keep bones healthy by working the muscles and bones against gravity. Start out at an exercise level that feels right for you. Add a little at a time until you can do the following: ? Do 30 minutes of weight-bearing exercise on most days of the week. Walking, jogging, stair climbing, and dancing are good choices. ? Do resistance exercises with weights or elastic bands 2 to 3 days a week. · Limit alcohol. Drink no more than 1 alcohol drink a day if you are a woman. Drink no more than 2 alcohol drinks a day if you are a man. · Do not smoke. Smoking can make bones thin faster. If you need help quitting, talk to your doctor about stop-smoking programs and medicines. These can increase your chances of quitting for good. When should you call for help? Watch closely for changes in your health, and be sure to contact your doctor if you have any problems. Where can you learn more? Go to http://tiago-jay.info/ Enter A007 in the search box to learn more about \"Preventing Osteoporosis: Care Instructions. \" Current as of: August 7, 2019               Content Version: 12.5 © 0505-0189 [employer]wise, Incorporated. Care instructions adapted under license by [employer] (which disclaims liability or warranty for this information). If you have questions about a medical condition or this instruction, always ask your healthcare professional. [address] any warranty or liability for your use of this information.

## 2020-07-29 NOTE — PROGRESS NOTES
Chief Complaint   Patient presents with    Follow Up Chronic Condition     Cholesterol, Osteoporisis     1. Have you been to the ER, urgent care clinic since your last visit? Hospitalized since your last visit? No    2. Have you seen or consulted any other health care providers outside of the [address] [place] [address] since your last visit? Include any pap smears or colon screening.  Yes Where: Surgery with   Dr. Anuja Renee

## 2020-09-17 ENCOUNTER — CLINICAL SUPPORT (OUTPATIENT)
Dept: FAMILY MEDICINE CLINIC | Age: 65
End: 2020-09-17

## 2020-09-17 DIAGNOSIS — Z23 ENCOUNTER FOR IMMUNIZATION: Primary | ICD-10-CM

## 2020-09-17 NOTE — PROGRESS NOTES
Chief Complaint   Patient presents with    Immunization/Injection     Influenza vaccine     After obtaining consent, and per orders of Dr. Ralph Kisney, injection of influenza given by Tiny Ng LPN. Patient tolerated injection well. Observed for 5 minutes, with no signs and symptoms of adverse reactions noted.

## 2020-10-29 ENCOUNTER — OFFICE VISIT (OUTPATIENT)
Dept: FAMILY MEDICINE CLINIC | Age: 65
End: 2020-10-29
Payer: MEDICARE

## 2020-10-29 VITALS
SYSTOLIC BLOOD PRESSURE: 117 MMHG | OXYGEN SATURATION: 98 % | HEIGHT: 58 IN | TEMPERATURE: 97.8 F | HEART RATE: 54 BPM | BODY MASS INDEX: 18.77 KG/M2 | WEIGHT: 89.4 LBS | DIASTOLIC BLOOD PRESSURE: 56 MMHG | RESPIRATION RATE: 19 BRPM

## 2020-10-29 DIAGNOSIS — E78.5 HYPERLIPIDEMIA, UNSPECIFIED HYPERLIPIDEMIA TYPE: ICD-10-CM

## 2020-10-29 DIAGNOSIS — E55.9 VITAMIN D DEFICIENCY: ICD-10-CM

## 2020-10-29 DIAGNOSIS — C44.99 EXTRAMAMMARY PAGET'S DISEASE: ICD-10-CM

## 2020-10-29 DIAGNOSIS — R07.9 CHEST PAIN, UNSPECIFIED TYPE: ICD-10-CM

## 2020-10-29 DIAGNOSIS — M81.0 OSTEOPOROSIS WITHOUT CURRENT PATHOLOGICAL FRACTURE, UNSPECIFIED OSTEOPOROSIS TYPE: ICD-10-CM

## 2020-10-29 DIAGNOSIS — R06.09 DOE (DYSPNEA ON EXERTION): Primary | ICD-10-CM

## 2020-10-29 DIAGNOSIS — D12.6 TUBULAR ADENOMA OF COLON: ICD-10-CM

## 2020-10-29 PROCEDURE — 93000 ELECTROCARDIOGRAM COMPLETE: CPT | Performed by: INTERNAL MEDICINE

## 2020-10-29 PROCEDURE — 99214 OFFICE O/P EST MOD 30 MIN: CPT | Performed by: INTERNAL MEDICINE

## 2020-10-29 NOTE — PROGRESS NOTES
Chief Complaint   Patient presents with   Veronika Calderon of Breath       Pt is a 72y.o. year old female who presents for follow up of her chronic medical problems    Has both [employer] and Medicare now    Health Maintenance Due   Topic Date Due    Colorectal Cancer Screening Combo -was set for colonoscopy but got busy with  that has so many surgeries done recently 08/11/2016    Breast Cancer Screen Mammogram -will doit  next year at [facility]/busy with her  she says 03/08/2019    GLAUCOMA SCREENING Q2Y -wears eyeglasses, done at [facility] 07/26/2020    Pneumococcal 65+ years (1 of 1 - PPSV23)-advised to get Prevnar 13 at her pharmacy 07/26/2020   Already had shingles shot     Wt Readings from Last 3 Encounters:   10/29/20 89 lb 6.4 oz (40.6 kg)   02/26/20 90 lb 6.4 oz (41 kg)   09/19/19 97 lb 6.4 oz (44.2 kg)     BP Readings from Last 3 Encounters:   10/29/20 (!) 117/56   02/26/20 127/57   09/19/19 120/54     Recent FRANKLIN for the last 1-2 months-like walking in the grocery when pushing a cart; walking for exercise at home on the treadmill and she does not feel SOB  ? from the mask  No hx of asthma  Stressed with 's illness   Admits to midline chest discomfort also when this happens and he has to catch her breath/has to take a deep breath    Lab Results   Component Value Date/Time    Cholesterol, total 225 (H) 09/19/2019 11:54 AM    HDL Cholesterol 97 (H) 09/19/2019 11:54 AM    LDL, calculated 117 (H) 09/19/2019 11:54 AM    VLDL, calculated 11 09/19/2019 11:54 AM    Triglyceride 54 09/19/2019 11:54 AM   Fasting today      ROS:    Pt denies: Wt loss, Fever/Chills, HA, Visual changes, Fatigue, Chest pain, SOB, FRANKLIN, Abd pain, N/V/D/C, Blood in stool or urine, Edema. Pertinent positive as above in HPI.  All others were negative    Patient Active Problem List   Diagnosis Code    Vitamin D deficiency E55.9    Hyperlipidemia E78.5    Osteoporosis M81.0    Atypical squamous cells of undetermined significance on cytologic smear of cervix (ASC-US) R87.610    Dyspareunia, female N94.10    Extramammary Paget's disease C44.99       Past Medical History:   Diagnosis Date    Arthritis     ASCUS with positive high risk HPV 11/5/2013    Sees Gyn    Esophagitis 6/18/2013    Has been on Omeprazole for a while now    Extramammary Paget's disease 2/26/2020    GERD (gastroesophageal reflux disease)     Hypercholesterolemia     PUD (peptic ulcer disease)     as a teenager       Current Outpatient Medications   Medication Sig Dispense Refill    ascorbic acid (VITAMIN C PO) Take  by mouth.  multivitamin (ONE A DAY) tablet Take 1 Tab by mouth daily.  co-enzyme Q-10 (CO Q-10) 100 mg capsule Take 100 mg by mouth daily. Social History     Tobacco Use   Smoking Status Never Smoker   Smokeless Tobacco Never Used       No Known Allergies    Patient Labs were reviewed: yes      Patient Past Records were reviewed:  yes        Objective:     Vitals:    10/29/20 1319   BP: (!) 117/56   Pulse: (!) 54   Resp: 19   Temp: 97.8 °F (36.6 °C)   TempSrc: Oral   SpO2: 98%   Weight: 89 lb 6.4 oz (40.6 kg)   Height: 4' 10\" (1.473 m)     Body mass index is 18.68 kg/m². Exam:   Appearance: alert, well appearing,  oriented to person, place, and time, acyanotic, in no respiratory distress and well hydrated. HEENT:  NC/AT, pink conj, anicteric sclerae  Neck:  No cervical lymphadenopathy, no JVD, no thyromegaly, no carotid bruit  Heart:  RRR without M/R/G, no reproducible CP  Lungs:  CTAB, no rhonchi, rales, or wheezes with good air exchange   Abdomen:  Non-tender, pos bowel sounds, no hepatosplenomegaly  Ext:  No C/C/E    Skin: no rash  Neuro: no lateralizing signs, CNs II-XII intact    EKG: sinus bradycardia, non specific ST-T wave changes (V1 and V2), short [place]    Assessment/ Plan:   Diagnoses and all orders for this visit:    1. FRANKLIN (dyspnea on exertion)-etio?  Need to rule out anginal equivalent  -     CBC WITH AUTOMATED DIFF; Future  -     METABOLIC PANEL, COMPREHENSIVE; Future  -     AMB POC EKG ROUTINE W/ 12 LEADS, INTER & REP  -     ECHO STRESS; Future    2. Chest pain, unspecified type-as above  -     CBC WITH AUTOMATED DIFF; Future  -     METABOLIC PANEL, COMPREHENSIVE; Future  -     AMB POC EKG ROUTINE W/ 12 LEADS, INTER & REP  -     ECHO STRESS; Future    3. Vitamin D deficiency-on OTC Vit D  -     VITAMIN D, 25 HYDROXY; Future    4. Hyperlipidemia, unspecified hyperlipidemia type-low cholesterol diet advised as previous values showed elev HDL  -     LIPID PANEL; Future    5. Osteoporosis without current pathological fracture, unspecified osteoporosis type-still declines treatment; continue to walk for exercise, daily OTC Ca+D    6. Tubular adenoma of colon-colonoscopy rescheduled for next year    7. Extramammary Paget's disease-s/p repeat surgery/margins clear this time        Follow-up and Dispositions    · Return in about 3 months (around 1/29/2021) for follow up, annual wellness-welcome to Medicare exam.     order mammo and repeat Dexa with annual wellness visit        I have discussed the diagnosis with the patient and the intended plan as seen in the above orders. The patient has received an After-Visit Summary and questions were answered concerning future plans. Medication Side Effects and Warnings were discussed with patient: yes    Patient verbalized understanding of above instructions.     Genet Morin MD  Internal Medicine  [facility]

## 2020-10-30 LAB
25(OH)D3 SERPL-MCNC: 32.3 NG/ML (ref 32–100)
A-G RATIO,AGRAT: 2 RATIO (ref 1.1–2.6)
ABSOLUTE LYMPHOCYTE COUNT, 10803: 1.7 K/UL (ref 1–4.8)
ALBUMIN SERPL-MCNC: 4.7 G/DL (ref 3.5–5)
ALP SERPL-CCNC: 61 U/L (ref 40–120)
ALT SERPL-CCNC: 14 U/L (ref 5–40)
ANION GAP SERPL CALC-SCNC: 11 MMOL/L (ref 3–15)
AST SERPL W P-5'-P-CCNC: 18 U/L (ref 10–37)
BASOPHILS # BLD: 0 K/UL (ref 0–0.2)
BASOPHILS NFR BLD: 1 % (ref 0–2)
BILIRUB SERPL-MCNC: 0.5 MG/DL (ref 0.2–1.2)
BUN SERPL-MCNC: 15 MG/DL (ref 6–22)
CALCIUM SERPL-MCNC: 9.3 MG/DL (ref 8.4–10.5)
CHLORIDE SERPL-SCNC: 99 MMOL/L (ref 98–110)
CHOLEST SERPL-MCNC: 226 MG/DL (ref 110–200)
CO2 SERPL-SCNC: 28 MMOL/L (ref 20–32)
CREAT SERPL-MCNC: 0.6 MG/DL (ref 0.8–1.4)
EOSINOPHIL # BLD: 0 K/UL (ref 0–0.5)
EOSINOPHIL NFR BLD: 1 % (ref 0–6)
ERYTHROCYTE [DISTWIDTH] IN BLOOD BY AUTOMATED COUNT: 13 % (ref 10–15.5)
GFRAA, 66117: >60
GFRNA, 66118: >60
GLOBULIN,GLOB: 2.4 G/DL (ref 2–4)
GLUCOSE SERPL-MCNC: 79 MG/DL (ref 70–99)
GRANULOCYTES,GRANS: 58 % (ref 40–75)
HCT VFR BLD AUTO: 41 % (ref 35.1–48.3)
HDLC SERPL-MCNC: 2.7 MG/DL (ref 0–5)
HDLC SERPL-MCNC: 84 MG/DL
HGB BLD-MCNC: 12.8 G/DL (ref 11.7–16.1)
LDL/HDL RATIO,LDHD: 1.5
LDLC SERPL CALC-MCNC: 124 MG/DL (ref 50–99)
LYMPHOCYTES, LYMLT: 34 % (ref 20–45)
MCH RBC QN AUTO: 28 PG (ref 26–34)
MCHC RBC AUTO-ENTMCNC: 31 G/DL (ref 31–36)
MCV RBC AUTO: 90 FL (ref 81–99)
MONOCYTES # BLD: 0.3 K/UL (ref 0.1–1)
MONOCYTES NFR BLD: 7 % (ref 3–12)
NEUTROPHILS # BLD AUTO: 2.8 K/UL (ref 1.8–7.7)
NON-HDL CHOLESTEROL, 011976: 142 MG/DL
PLATELET # BLD AUTO: 211 K/UL (ref 140–440)
PMV BLD AUTO: 10.6 FL (ref 9–13)
POTASSIUM SERPL-SCNC: 4 MMOL/L (ref 3.5–5.5)
PROT SERPL-MCNC: 7.1 G/DL (ref 6.2–8.1)
RBC # BLD AUTO: 4.55 M/UL (ref 3.8–5.2)
SODIUM SERPL-SCNC: 138 MMOL/L (ref 133–145)
TRIGL SERPL-MCNC: 88 MG/DL (ref 40–149)
VLDLC SERPL CALC-MCNC: 18 MG/DL (ref 8–30)
WBC # BLD AUTO: 4.8 K/UL (ref 4–11)

## 2020-10-30 NOTE — PATIENT INSTRUCTIONS

## 2020-12-16 ENCOUNTER — HOSPITAL ENCOUNTER (OUTPATIENT)
Dept: NON INVASIVE DIAGNOSTICS | Age: 65
Discharge: HOME | End: 2020-12-16
Attending: INTERNAL MEDICINE
Payer: COMMERCIAL

## 2020-12-16 VITALS
SYSTOLIC BLOOD PRESSURE: 111 MMHG | WEIGHT: 89 LBS | DIASTOLIC BLOOD PRESSURE: 72 MMHG | BODY MASS INDEX: 18.68 KG/M2 | HEIGHT: 58 IN

## 2020-12-16 DIAGNOSIS — R07.9 CHEST PAIN, UNSPECIFIED TYPE: ICD-10-CM

## 2020-12-16 DIAGNOSIS — R06.09 DOE (DYSPNEA ON EXERTION): ICD-10-CM

## 2020-12-16 PROCEDURE — 93351 STRESS TTE COMPLETE: CPT

## 2020-12-17 LAB
STRESS ANGINA INDEX: 0
STRESS BASELINE HR: 64 BPM
STRESS ESTIMATED WORKLOAD: 8.5 METS
STRESS EXERCISE DUR MIN: NORMAL
STRESS PEAK DIAS BP: 72 MMHG
STRESS PEAK SYS BP: 162 MMHG
STRESS PERCENT HR ACHIEVED: 97 %
STRESS POST PEAK HR: 150 BPM
STRESS RATE PRESSURE PRODUCT: NORMAL BPM*MMHG
STRESS TARGET HR: 155 BPM

## 2020-12-31 ENCOUNTER — TELEPHONE (OUTPATIENT)
Dept: FAMILY MEDICINE CLINIC | Age: 65
End: 2020-12-31

## 2021-01-04 ENCOUNTER — VIRTUAL VISIT (OUTPATIENT)
Dept: FAMILY MEDICINE CLINIC | Age: 66
End: 2021-01-04
Payer: MEDICARE

## 2021-01-04 DIAGNOSIS — F43.9 STRESS AT HOME: ICD-10-CM

## 2021-01-04 DIAGNOSIS — R06.09 DOE (DYSPNEA ON EXERTION): Primary | ICD-10-CM

## 2021-01-04 PROCEDURE — G8427 DOCREV CUR MEDS BY ELIG CLIN: HCPCS | Performed by: INTERNAL MEDICINE

## 2021-01-04 PROCEDURE — 99213 OFFICE O/P EST LOW 20 MIN: CPT | Performed by: INTERNAL MEDICINE

## 2021-01-04 PROCEDURE — 1090F PRES/ABSN URINE INCON ASSESS: CPT | Performed by: INTERNAL MEDICINE

## 2021-01-04 PROCEDURE — 1101F PT FALLS ASSESS-DOCD LE1/YR: CPT | Performed by: INTERNAL MEDICINE

## 2021-01-04 PROCEDURE — G8432 DEP SCR NOT DOC, RNG: HCPCS | Performed by: INTERNAL MEDICINE

## 2021-01-04 PROCEDURE — 3017F COLORECTAL CA SCREEN DOC REV: CPT | Performed by: INTERNAL MEDICINE

## 2021-01-04 NOTE — PROGRESS NOTES
Garrick Wolff is a 72 y.o. female who was seen by synchronous (real-time) audio-video technology on 1/4/2021 for Form Completion and Other (Return to work letter)        Assessment & Plan:   Diagnoses and all orders for this visit:    1. FRANKLIN (dyspnea on exertion)-resolving; ok to go back to work  If this persists, will do PFTs/metacholine challenge test    2. Stress at home-declined meds or talk therapy        Follow-up and Dispositions    · Return for previous appt for welcome to medicare exam.       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 Authorizing Provider   ascorbic acid (VITAMIN C PO) Take  by mouth. Yes Provider, Historical   multivitamin (ONE A DAY) tablet Take 1 Tab by mouth daily. Yes Provider, Historical        Provider, Historical     Patient Active Problem List    Diagnosis Date Noted    Extramammary Paget's disease 02/26/2020    Dyspareunia, female 02/12/2016    Atypical squamous cells of undetermined significance on cytologic smear of cervix (ASC-US) 11/05/2013    Osteoporosis 07/01/2013    Vitamin D deficiency 06/18/2013    Hyperlipidemia 06/18/2013       ROS  Pt denies: Wt loss, Fever/Chills, HA, Visual changes, Fatigue, Chest pain, SOB, FRANKLIN, Abd pain, N/V/D/C, Blood in stool or urine, Edema. Pertinent positive as above in HPI. All others were negative  Objective:   No flowsheet data found.    General: alert, cooperative, no distress   Mental  status: normal mood, behavior, speech, dress, motor activity, and thought processes, able to follow commands   HENT: NCAT   Neck: no visualized mass   Resp: no respiratory distress   Neuro: no gross deficits   Skin: no discoloration or lesions of concern on visible areas   Psychiatric: normal affect, consistent with stated mood, no evidence of hallucinations     Additional exam findings: none      We discussed the expected course, resolution and complications of the diagnosis(es) in detail. Medication risks, benefits, costs, interactions, and alternatives were discussed as indicated. I advised her to contact the office if her condition worsens, changes or fails to improve as anticipated. She expressed understanding with the diagnosis(es) and plan. Abbie Hendricks, who was evaluated through a patient-initiated, synchronous (real-time) audio-video encounter, and/or her healthcare decision maker, is aware that it is a billable service, with coverage as determined by her insurance carrier. She provided verbal consent to proceed: Yes, and patient identification was verified. It was conducted pursuant to the emergency declaration under the [address] [place] [address] authority and the [employer] General Act. A caregiver was present when appropriate. Ability to conduct physical exam was limited. I was at home. The patient was at home.       Lillian Mckinley MD

## 2021-01-04 NOTE — PATIENT INSTRUCTIONS
Shortness of Breath: Care Instructions Your Care Instructions Shortness of breath has many causes. Sometimes conditions such as anxiety can lead to shortness of breath. Some people get mild shortness of breath when they exercise. Trouble breathing also can be a symptom of a serious problem, such as asthma, lung disease, emphysema, heart problems, and pneumonia. If your shortness of breath continues, you may need tests and treatment. Watch for any changes in your breathing and other symptoms. Follow-up care is a key part of your treatment and safety. Be sure to make and go to all appointments, and call your doctor if you are having problems. It's also a good idea to know your test results and keep a list of the medicines you take. How can you care for yourself at home? · Do not smoke or allow others to smoke around you. If you need help quitting, talk to your doctor about stop-smoking programs and medicines. These can increase your chances of quitting for good. · Get plenty of rest and sleep. · Take your medicines exactly as prescribed. Call your doctor if you think you are having a problem with your medicine. · Find healthy ways to deal with stress. ? Exercise daily. ? Get plenty of sleep. ? Eat regularly and well. When should you call for help? Call 911 anytime you think you may need emergency care. For example, call if: 
  · You have severe shortness of breath.  
  · You have symptoms of a heart attack. These may include: 
? Chest pain or pressure, or a strange feeling in the chest. 
? Sweating. ? Shortness of breath. ? Nausea or vomiting. ? Pain, pressure, or a strange feeling in the back, neck, jaw, or upper belly or in one or both shoulders or arms. ? Lightheadedness or sudden weakness. ? A fast or irregular heartbeat. After you call 911, the  may tell you to chew 1 adult-strength or 2 to 4 low-dose aspirin. Wait for an ambulance. Do not try to drive yourself. Call your doctor now or seek immediate medical care if: 
  · Your shortness of breath gets worse or you start to wheeze. Wheezing is a high-pitched sound when you breathe.  
  · You wake up at night out of breath or have to prop your head up on several pillows to breathe.  
  · You are short of breath after only light activity or while at rest.  
Watch closely for changes in your health, and be sure to contact your doctor if: 
  · You do not get better over the next 1 to 2 days. Where can you learn more? Go to http://www.gray.com/ Enter S780 in the search box to learn more about \"Shortness of Breath: Care Instructions. \" Current as of: February 24, 2020               Content Version: 12.6 © 2006-2020 [employer]. Care instructions adapted under license by [employer] (which disclaims liability or warranty for this information). If you have questions about a medical condition or this instruction, always ask your healthcare professional. [address] any warranty or liability for your use of this information.

## 2021-01-04 NOTE — PROGRESS NOTES
Chief Complaint   Patient presents with    Form Completion    Other     Return to work letter     1. Have you been to the ER, urgent care clinic since your last visit? Hospitalized since your last visit? No    2. Have you seen or consulted any other health care providers outside of the [address] [place] [address] since your last visit? Include any pap smears or colon screening.  Yes, stress test     Health Maintenance Due   Topic    Colorectal Cancer Screening Combo     Breast Cancer Screen Mammogram     GLAUCOMA SCREENING Q2Y     Pneumococcal 65+ years (1 of 1 - PPSV23)    Medicare Yearly Exam

## 2021-01-04 NOTE — LETTER
NOTIFICATION RETURN TO WORK  
 
1/4/2021 1:11 PM 
 
Ms. Christine Gonzalez [address] [place] [id number] To Whom It May Concern: Christine Gonzalez is currently under the care of Freya Renee. She is medically cleared to go back to work on: 1/5/2021. If there are questions or concerns please contact our office. Sincerely, Haseeb Dobbins MD

## 2021-01-14 ENCOUNTER — OFFICE VISIT (OUTPATIENT)
Dept: FAMILY MEDICINE CLINIC | Age: 66
End: 2021-01-14
Payer: MEDICARE

## 2021-01-14 VITALS
RESPIRATION RATE: 17 BRPM | HEIGHT: 58 IN | DIASTOLIC BLOOD PRESSURE: 65 MMHG | SYSTOLIC BLOOD PRESSURE: 124 MMHG | OXYGEN SATURATION: 95 % | WEIGHT: 91.6 LBS | BODY MASS INDEX: 19.23 KG/M2 | HEART RATE: 58 BPM

## 2021-01-14 DIAGNOSIS — C44.99 EXTRAMAMMARY PAGET'S DISEASE: ICD-10-CM

## 2021-01-14 DIAGNOSIS — Z71.89 ADVANCED DIRECTIVES, COUNSELING/DISCUSSION: ICD-10-CM

## 2021-01-14 DIAGNOSIS — Z23 ENCOUNTER FOR IMMUNIZATION: ICD-10-CM

## 2021-01-14 DIAGNOSIS — Z00.00 WELCOME TO MEDICARE PREVENTIVE VISIT: Primary | ICD-10-CM

## 2021-01-14 DIAGNOSIS — M81.0 OSTEOPOROSIS WITHOUT CURRENT PATHOLOGICAL FRACTURE, UNSPECIFIED OSTEOPOROSIS TYPE: ICD-10-CM

## 2021-01-14 DIAGNOSIS — R87.610 ATYPICAL SQUAMOUS CELLS OF UNDETERMINED SIGNIFICANCE ON CYTOLOGIC SMEAR OF CERVIX (ASC-US): ICD-10-CM

## 2021-01-14 DIAGNOSIS — E78.5 HYPERLIPIDEMIA, UNSPECIFIED HYPERLIPIDEMIA TYPE: ICD-10-CM

## 2021-01-14 DIAGNOSIS — D12.6 TUBULAR ADENOMA OF COLON: ICD-10-CM

## 2021-01-14 DIAGNOSIS — Z78.0 POSTMENOPAUSAL STATE: ICD-10-CM

## 2021-01-14 DIAGNOSIS — Z12.31 ENCOUNTER FOR SCREENING MAMMOGRAM FOR MALIGNANT NEOPLASM OF BREAST: ICD-10-CM

## 2021-01-14 PROCEDURE — G8510 SCR DEP NEG, NO PLAN REQD: HCPCS | Performed by: INTERNAL MEDICINE

## 2021-01-14 PROCEDURE — G8536 NO DOC ELDER MAL SCRN: HCPCS | Performed by: INTERNAL MEDICINE

## 2021-01-14 PROCEDURE — G0403 EKG FOR INITIAL PREVENT EXAM: HCPCS | Performed by: INTERNAL MEDICINE

## 2021-01-14 PROCEDURE — 90670 PCV13 VACCINE IM: CPT | Performed by: INTERNAL MEDICINE

## 2021-01-14 PROCEDURE — G8427 DOCREV CUR MEDS BY ELIG CLIN: HCPCS | Performed by: INTERNAL MEDICINE

## 2021-01-14 PROCEDURE — G8420 CALC BMI NORM PARAMETERS: HCPCS | Performed by: INTERNAL MEDICINE

## 2021-01-14 PROCEDURE — G0009 ADMIN PNEUMOCOCCAL VACCINE: HCPCS | Performed by: INTERNAL MEDICINE

## 2021-01-14 PROCEDURE — 1090F PRES/ABSN URINE INCON ASSESS: CPT | Performed by: INTERNAL MEDICINE

## 2021-01-14 PROCEDURE — G0402 INITIAL PREVENTIVE EXAM: HCPCS | Performed by: INTERNAL MEDICINE

## 2021-01-14 NOTE — PROGRESS NOTES
This is a \"Welcome to [employer]"  Initial Preventive Physical Examination (IPPE) providing Personalized Prevention Plan Services (Performed in the first 12 months of enrollment)    I have reviewed the patient's medical history in detail and updated the computerized patient record. Had to resched colonoscopy because of 's medical procedures  Wants mammo after pandemic      Lab Results   Component Value Date/Time    Cholesterol, total 226 (H) 10/29/2020 02:39 PM    HDL Cholesterol 84 10/29/2020 02:39 PM    LDL, calculated 124 (H) 10/29/2020 02:39 PM    VLDL, calculated 18 10/29/2020 02:39 PM    Triglyceride 88 10/29/2020 02:39 PM   Not on statin  ASCVD risk: 5%   Recent neg stress echo      Prevnar 13 today     2015-osteoporosis; has previously declined treatment  but exercise regularly and takes Ca+D daily  Exercises daily-yoga and walking  Lab Results   Component Value Date/Time    VITAMIN D, 25-HYDROXY 32.3 10/29/2020 02:39 PM             Depression Risk Screen     3 most recent PHQ Screens 1/14/2021   Little interest or pleasure in doing things Not at all   Feeling down, depressed, irritable, or hopeless Not at all   Total Score PHQ 2 0       Alcohol Risk Screen    Do you average more than 1 drink per night or more than 7 drinks a week:  No    On any one occasion in the past three months have you have had more than 3 drinks containing alcohol:  No          Functional Ability and Level of Safety    Diet: No special diet      Hearing: Hearing is good. Vision Screening:  Vision is good. with eyeglasses   Visual Acuity Screening    Right eye Left eye Both eyes   Without correction:      With correction: 20/25 20/30 20/30         Activities of Daily Living: The home contains: [employer]  Patient does total self care      Ambulation: with no difficulty      Exercise level: moderately active     Fall Risk Screen:  Fall Risk Assessment, last 12 mths 1/14/2021   Able to walk? Yes   Fall in past 12 months? 0   Do you feel unsteady? 0   Are you worried about falling 0      Abuse Screen:  Patient is not abused       Screening EKG   EKG order placed: Yes     EKG reviewed: sinus bradycardia, no LVH, no ST-T wave changes  Patient with hx of low HR, asymptomatic    End of Life Planning   Advanced care planning directives were discussed with the patient and /or family/caregiver. Assessment/Plan   Education and counseling provided:  Are appropriate based on today's review and evaluation  End-of-Life planning (with patient's consent)-see ACP note  Pneumococcal Vaccine-Prevnar 13 today, Pneumovax 23 after 1 year  Influenza Vaccine-done  Screening Mammography-patient will schedule after she gets the COVID vaccine  Screening Pap and pelvic (covered once every 2 years)-c/o Gyn  Colorectal cancer screening tests-she will reschedule  Cardiovascular screening blood test-fasting lipids up to date  Bone mass measurement (DEXA)-ordered  Screening for glaucoma-advised to schedule eye exam shortly  Diabetes screening test-FBS up to date and normal    Diagnoses and all orders for this visit:    1. Welcome to Medicare preventive visit-Refer to above for plan and to patient instructions for recommendations on HM  -     AMB POC EKG ROUTINE W/ 12 LEADS, SCREEN ()    2. Postmenopausal state  -     DEXA BONE DENSITY STUDY AXIAL; Future    3. Hyperlipidemia, unspecified hyperlipidemia type-ASCVD risk is 5%; repeat lipids in 6 months; continue with low fat diet    4. Osteoporosis without current pathological fracture, unspecified osteoporosis type-follow up Dexa ordered, continue with regular weight bearing exercises and daily Ca+D    5. Extramammary Paget's disease-s/p excision 7/2020    6. Tubular adenoma of colon-advised to follow up on colonoscopy schedule    7. Atypical squamous cells of undetermined significance on cytologic smear of cervix (ASC-US)-advised to follow up with her Gyn    8.  Encounter for screening mammogram for malignant neoplasm of breast  -     ELEAZAR MAMMO BI SCREENING INCL CAD; Future    9. Encounter for immunization  -     PNEUMOCOCCAL CONJ VACCINE 13 VALENT IM    10. Advanced directives, counseling/discussion    RTC yearly for wellness visit  Follow up in 6 months re other medical problems-repeat lipids, talk about osteoporosis tx and make sure she went t all her appts-GI, Gyn    Health Maintenance Due     Health Maintenance Due   Topic Date Due    Colorectal Cancer Screening Combo  08/11/2016    Breast Cancer Screen Mammogram  03/08/2019    GLAUCOMA SCREENING Q2Y  07/26/2020       Patient Care Team   Patient Care Team:  Jac Lee MD as PCP - General (Internal Medicine)  Jac Lee MD as PCP - [facility] Empaneled Provider  Sasha Harris DO (Obstetrics & Gynecology)  Lea Stockton MD (Gastroenterology)    History     Past Medical History:   Diagnosis Date    Arthritis     ASCUS with positive high risk HPV 11/5/2013    Sees Gyn    Esophagitis 6/18/2013    Has been on Omeprazole for a while now    Extramammary Paget's disease 2/26/2020    GERD (gastroesophageal reflux disease)     Hypercholesterolemia     PUD (peptic ulcer disease)     as a teenager      Past Surgical History:   Procedure Laterality Date    HX COLONOSCOPY  2010    found polyps    HX COLPOSCOPY  08/2013    HX GI      HX GYN       Current Outpatient Medications   Medication Sig Dispense Refill    ascorbic acid (VITAMIN C PO) Take  by mouth.  multivitamin (ONE A DAY) tablet Take 1 Tab by mouth daily. No Known Allergies    Family History   Problem Relation Age of Onset    Hypertension Mother     Stroke Father      Social History     Tobacco Use    Smoking status: Never Smoker    Smokeless tobacco: Never Used   Substance Use Topics    Alcohol use:  No

## 2021-01-14 NOTE — ACP (ADVANCE CARE PLANNING)
Advance Care Planning     Advance Care Planning (ACP) Physician/NP/PA Conversation      Date of Conversation: 1/14/2021  Conducted with: Patient with Decision Making Capacity    Healthcare Decision Maker:     Primary Decision Maker (Active): Kennedy Drew Spouse - [phone]    Secondary Decision Maker: Vijay Hamilton - Sister - [phone]    Supplemental (Other) Decision Maker: Chi Ziegler - Sister - [phone]      Care Preferences:    Hospitalization: \"If your health worsens and it becomes clear that your chance of recovery is unlikely, what would be your preference regarding hospitalization? \"  The patient would prefer comfort-focused treatment without hospitalization. Ventilation: \"If you were unable to breathe on your own and your chance of recovery was unlikely, what would be your preference about the use of a ventilator (breathing machine) if it was available to you? \"   The patient would desire the use of a ventilator. only for a short period of time    Resuscitation: \"In the event your heart stopped as a result of an underlying serious health condition, would you want attempts to be made to restart your heart, or would you prefer a natural death? \"   The patient is unsure.   Depends on her age she says    Additional topics discussed: ventilation preferences, hospitalization preferences, resuscitation preferences and end of life care preferences (vegetative state/imminent death)    Conversation Outcomes / Follow-Up Plan:   ACP in process - information provided, considering goals and options  Reviewed DNR/DNI and patient elects Full Code (Attempt Resuscitation)     Length of Voluntary ACP Conversation in minutes:  <16 minutes (Non-Billable)    Galina Chavarria MD

## 2021-01-14 NOTE — PROGRESS NOTES
Chief Complaint   Patient presents with    Welcome To Medicare    Immunization/Injection     Pneumonia vaccine     1. Have you been to the ER, urgent care clinic since your last visit? Hospitalized since your last visit? No    2. Have you seen or consulted any other health care providers outside of the [address] [place] [address] since your last visit? Include any pap smears or colon screening.  Yes Where: Stress Test     Health Maintenance Due   Topic Date Due    Colorectal Cancer Screening Combo  08/11/2016    Breast Cancer Screen Mammogram  03/08/2019    GLAUCOMA SCREENING Q2Y  07/26/2020    Pneumococcal 65+ years (1 of 1 - PPSV23) 07/26/2020    Medicare Yearly Exam  10/29/2020

## 2021-01-14 NOTE — PROGRESS NOTES
After obtaining consent, and per orders of Dr. Nena Ro, injection of Prev Mando 13 given by Nolan Pastrana. Patient instructed to remain in clinic for 20 minutes afterwards, and to report any adverse reaction to me immediately. Pt tolerated well. Pt experienced NO adverse reactions noted.

## 2021-01-14 NOTE — PATIENT INSTRUCTIONS
Medicare Wellness Visit, Female The best way to live healthy is to have a lifestyle where you eat a well-balanced diet, exercise regularly, limit alcohol use, and quit all forms of tobacco/nicotine, if applicable. Regular preventive services are another way to keep healthy. Preventive services (vaccines, screening tests, monitoring & exams) can help personalize your care plan, which helps you manage your own care. Screening tests can find health problems at the earliest stages, when they are easiest to treat. [facility] follows the current, evidence-based guidelines published by the [facility] Gregor Mireles ([facility]) when recommending preventive services for our patients. Because we follow these guidelines, sometimes recommendations change over time as research supports it. (For example, mammograms used to be recommended annually. Even though Medicare will still pay for an annual mammogram, the newer guidelines recommend a mammogram every two years for women of average risk). Of course, you and your doctor may decide to screen more often for some diseases, based on your risk and your co-morbidities (chronic disease you are already diagnosed with). Preventive services for you include: - Medicare offers their members a free annual wellness visit, which is time for you and your primary care provider to discuss and plan for your preventive service needs. Take advantage of this benefit every year! 
-All adults over the age of 72 should receive the recommended pneumonia vaccines. Current USPSTF guidelines recommend a series of two vaccines for the best pneumonia protection.  
-All adults should have a flu vaccine yearly and a tetanus vaccine every 10 years.  
-All adults age 48 and older should receive the shingles vaccines (series of two vaccines).      
-All adults age 38-68 who are overweight should have a diabetes screening test once every three years.  
-All adults born between 80 and 1965 should be screened once for Hepatitis C. 
-Other screening tests and preventive services for persons with diabetes include: an eye exam to screen for diabetic retinopathy, a kidney function test, a foot exam, and stricter control over your cholesterol.  
-Cardiovascular screening for adults with routine risk involves an electrocardiogram (ECG) at intervals determined by your doctor.  
-Colorectal cancer screenings should be done for adults age 54-65 with no increased risk factors for colorectal cancer. There are a number of acceptable methods of screening for this type of cancer. Each test has its own benefits and drawbacks. Discuss with your doctor what is most appropriate for you during your annual wellness visit. The different tests include: colonoscopy (considered the best screening method), a fecal occult blood test, a fecal DNA test, and sigmoidoscopy. 
 
-A bone mass density test is recommended when a woman turns 65 to screen for osteoporosis. This test is only recommended one time, as a screening. Some providers will use this same test as a disease monitoring tool if you already have osteoporosis. -Breast cancer screenings are recommended every other year for women of normal risk, age 54-69. 
-Cervical cancer screenings for women over age 72 are only recommended with certain risk factors. Here is a list of your current Health Maintenance items (your personalized list of preventive services) with a due date: 
Health Maintenance Due Topic Date Due  
 Colorectal Screening  08/11/2016  Mammogram  03/08/2019  Glaucoma Screening   07/26/2020  Pneumococcal Vaccine (1 of 1 - PPSV23) 07/26/2020 Medicare Wellness Visit, Female The best way to live healthy is to have a lifestyle where you eat a well-balanced diet, exercise regularly, limit alcohol use, and quit all forms of tobacco/nicotine, if applicable.  
  
Regular preventive services are another way to keep healthy. Preventive services (vaccines, screening tests, monitoring & exams) can help personalize your care plan, which helps you manage your own care. Screening tests can find health problems at the earliest stages, when they are easiest to treat. [facility] follows the current, evidence-based guidelines published by the [facility] Gregor Mireles ([facility]) when recommending preventive services for our patients. Because we follow these guidelines, sometimes recommendations change over time as research supports it. (For example, mammograms used to be recommended annually. Even though Medicare will still pay for an annual mammogram, the newer guidelines recommend a mammogram every two years for women of average risk). Of course, you and your doctor may decide to screen more often for some diseases, based on your risk and your co-morbidities (chronic disease you are already diagnosed with). Preventive services for you include: - Medicare offers their members a free annual wellness visit, which is time for you and your primary care provider to discuss and plan for your preventive service needs. Take advantage of this benefit every year! 
-All adults over the age of 72 should receive the recommended pneumonia vaccines. Current USPSTF guidelines recommend a series of two vaccines for the best pneumonia protection.  
-All adults should have a flu vaccine yearly and a tetanus vaccine every 10 years.  
-All adults age 48 and older should receive the shingles vaccines (series of two vaccines).      
-All adults age 38-68 who are overweight should have a diabetes screening test once every three years.  
-All adults born between 80 and 1965 should be screened once for Hepatitis C. 
-Other screening tests and preventive services for persons with diabetes include: an eye exam to screen for diabetic retinopathy, a kidney function test, a foot exam, and stricter control over your cholesterol.  
-Cardiovascular screening for adults with routine risk involves an electrocardiogram (ECG) at intervals determined by your doctor.  
-Colorectal cancer screenings should be done for adults age 54-65 with no increased risk factors for colorectal cancer. There are a number of acceptable methods of screening for this type of cancer. Each test has its own benefits and drawbacks. Discuss with your doctor what is most appropriate for you during your annual wellness visit. The different tests include: colonoscopy (considered the best screening method), a fecal occult blood test, a fecal DNA test, and sigmoidoscopy. 
 
-A bone mass density test is recommended when a woman turns 65 to screen for osteoporosis. This test is only recommended one time, as a screening. Some providers will use this same test as a disease monitoring tool if you already have osteoporosis. -Breast cancer screenings are recommended every other year for women of normal risk, age 54-69. 
-Cervical cancer screenings for women over age 72 are only recommended with certain risk factors. Here is a list of your current Health Maintenance items (your personalized list of preventive services) with a due date: 
Health Maintenance Due Topic Date Due  
 Colorectal Screening  08/11/2016  Mammogram  03/08/2019  Glaucoma Screening   07/26/2020

## 2021-11-08 ENCOUNTER — OFFICE VISIT (OUTPATIENT)
Dept: FAMILY MEDICINE CLINIC | Age: 66
End: 2021-11-08
Payer: MEDICARE

## 2021-11-08 VITALS
HEART RATE: 60 BPM | WEIGHT: 90 LBS | RESPIRATION RATE: 16 BRPM | TEMPERATURE: 98.4 F | DIASTOLIC BLOOD PRESSURE: 60 MMHG | OXYGEN SATURATION: 98 % | SYSTOLIC BLOOD PRESSURE: 111 MMHG | BODY MASS INDEX: 18.89 KG/M2 | HEIGHT: 58 IN

## 2021-11-08 DIAGNOSIS — E78.5 HYPERLIPIDEMIA, UNSPECIFIED HYPERLIPIDEMIA TYPE: ICD-10-CM

## 2021-11-08 DIAGNOSIS — C44.99 EXTRAMAMMARY PAGET'S DISEASE: ICD-10-CM

## 2021-11-08 DIAGNOSIS — R49.0 VOICE HOARSENESS: Primary | ICD-10-CM

## 2021-11-08 DIAGNOSIS — M81.0 OSTEOPOROSIS WITHOUT CURRENT PATHOLOGICAL FRACTURE, UNSPECIFIED OSTEOPOROSIS TYPE: ICD-10-CM

## 2021-11-08 PROCEDURE — 1090F PRES/ABSN URINE INCON ASSESS: CPT | Performed by: INTERNAL MEDICINE

## 2021-11-08 PROCEDURE — 99214 OFFICE O/P EST MOD 30 MIN: CPT | Performed by: INTERNAL MEDICINE

## 2021-11-08 PROCEDURE — G9899 SCRN MAM PERF RSLTS DOC: HCPCS | Performed by: INTERNAL MEDICINE

## 2021-11-08 PROCEDURE — G8536 NO DOC ELDER MAL SCRN: HCPCS | Performed by: INTERNAL MEDICINE

## 2021-11-08 PROCEDURE — 3017F COLORECTAL CA SCREEN DOC REV: CPT | Performed by: INTERNAL MEDICINE

## 2021-11-08 PROCEDURE — G8420 CALC BMI NORM PARAMETERS: HCPCS | Performed by: INTERNAL MEDICINE

## 2021-11-08 PROCEDURE — G8427 DOCREV CUR MEDS BY ELIG CLIN: HCPCS | Performed by: INTERNAL MEDICINE

## 2021-11-08 PROCEDURE — G8510 SCR DEP NEG, NO PLAN REQD: HCPCS | Performed by: INTERNAL MEDICINE

## 2021-11-08 PROCEDURE — 1101F PT FALLS ASSESS-DOCD LE1/YR: CPT | Performed by: INTERNAL MEDICINE

## 2021-11-08 NOTE — PATIENT INSTRUCTIONS
Hoarseness: Care Instructions  Your Care Instructions     Many things can cause your voice to become rough, raspy, or hard to hear. Having a cold or a sinus infection, talking too loudly or yelling, smoking, or breathing dry air can cause a hoarse voice. You also can have voice problems from pollution and allergies. Sometimes, acid from your stomach can back up into your throatcalled acid refluxand change your voice. In some cases, a problem with the voice box, or larynx, causes hoarseness. Rest and home care may be all you need if you have a hoarse voice. Follow-up care is a key part of your treatment and safety. Be sure to make and go to all appointments, and call your doctor if you are having problems. It's also a good idea to know your test results and keep a list of the medicines you take. How can you care for yourself at home? · Follow your doctor's advice about how much to talk. Use email or write notes when you can to rest your voice. · If your doctor prescribed antibiotics, take them as directed. Do not stop taking them just because you feel better. You need to take the full course of antibiotics. · Talk to your doctor about treatment for allergies if you do not already treat them. · Follow your treatment plan for acid reflux (if you have the condition):  ? Take your medicines exactly as prescribed. Call your doctor if you think you are having a problem with your medicine. ? Limit or stop eating foods that make your acid reflux worse. These may include tomatoes, spicy foods, and chocolate. ? Limit or stop drinking alcohol and drinks that have caffeine, such as coffee, tea, and ángel. ? Raise the head of your bed a few inches. Place a brick or a foam wedge under the mattress at the head of the bed. This will help keep stomach acid out of your throat at night. · When you do talk, do not whisper. It can be hard on your voice. · Use a vaporizer or humidifier to add moisture to your bedroom. Follow the directions for cleaning the machine. · Drink plenty of water to keep your throat moist. If you have kidney, heart, or liver disease and have to limit fluids, talk with your doctor before you increase the amount of fluids you drink. · Do not smoke. Smoking can make your voice raspy and can increase your risk of throat cancer. If you need help quitting, talk to your doctor about stop-smoking programs and medicines. These can increase your chances of quitting for good. · To keep your voice from getting hoarse in the future, try not to talk loudly or shout, such as at sports events. When should you call for help? Call 911 anytime you think you may need emergency care. For example, call if:    · You have trouble breathing. Call your doctor now or seek immediate medical care if:    · You have trouble swallowing.     · You have new or worse pain. Watch closely for changes in your health, and be sure to contact your doctor if:    · You do not get better as expected. Where can you learn more? Go to http://www.gray.com/  Enter P454 in the search box to learn more about \"Hoarseness: Care Instructions. \"  Current as of: December 2, 2020               Content Version: 13.0  © 7964-8859 [employer], [employer]. Care instructions adapted under license by [employer] (which disclaims liability or warranty for this information). If you have questions about a medical condition or this instruction, always ask your healthcare professional. [place] [address] any warranty or liability for your use of this information.

## 2021-11-08 NOTE — PROGRESS NOTES
Patient seen for routine follow up with c/o hoarseness     1. Have you been to the ER, urgent care clinic since your last visit? Hospitalized since your last visit? No    2. Have you seen or consulted any other health care providers outside of the [address] [place] [address] since your last visit? Include any pap smears or colon screening.  No        Health Maintenance Due   Topic Date Due    Breast Cancer Screen Mammogram  03/08/2019

## 2021-11-08 NOTE — PROGRESS NOTES
Assessment/ Plan:   Diagnoses and all orders for this visit:    1. Voice hoarseness-worsening over the course of 1 yr  -     REFERRAL TO ENT-OTOLARYNGOLOGY    2. Hyperlipidemia, unspecified hyperlipidemia type-low cholesterol diet advised  and will determine ASCVD risk with next labs to see if she will benefit from taking a statin    3. Extramammary Paget's disease-has been released from surgical care; other follow up? 4. Osteoporosis without current pathological fracture, unspecified osteoporosis type-continue with daily Ca+D, walk for exercise as she declines treatment for this  Last Dexa was 2015        Follow-up and Dispositions    · Return in about 2 months (around 1/8/2022) for annual wellness, follow up.                  Chief Complaint   Patient presents with    Follow Up Chronic Condition    Hoarse       Pt is a 77y.o. year old female who presents for follow up of her chronic medical problems    Health Maintenance Due   Topic Date Due    COVID-19 Vaccine (1)-booster next week Never done    Breast Cancer Screen Mammogram -[address] [place] [address]; patient aware she needs to do it 03/08/2019    already got her flu shot      Still declines treatment for osteoporosis    Lab Results   Component Value Date/Time    Cholesterol, total 226 (H) 10/29/2020 02:39 PM    HDL Cholesterol 84 10/29/2020 02:39 PM    LDL, calculated 124 (H) 10/29/2020 02:39 PM    VLDL, calculated 18 10/29/2020 02:39 PM    Triglyceride 88 10/29/2020 02:39 PM   wants to do labs next time/not fasting today    BP Readings from Last 3 Encounters:   11/08/21 111/60   01/14/21 124/65   12/16/20 111/72       Wt Readings from Last 3 Encounters:   11/08/21 90 lb (40.8 kg)   01/14/21 91 lb 9.6 oz (41.5 kg)   12/16/20 89 lb (40.4 kg)       Throat feels dry all the time-scratchy when she goes out; when she wakes up in the morning; voice hoarseness that has persisted and has been noticed by relatives  No phlegm, denies allergy symptoms  Ongoing for over a year but worsening  No hx of smoking  Denies GERD sxs    ROS:    Pt denies: Wt loss, Fever/Chills, HA, Visual changes, Fatigue, Chest pain, SOB, FRANKLIN, Abd pain, N/V/D/C, Blood in stool or urine, Edema. Pertinent positive as above in HPI. All others were negative    Patient Active Problem List   Diagnosis Code    Vitamin D deficiency E55.9    Hyperlipidemia E78.5    Osteoporosis M81.0    Atypical squamous cells of undetermined significance on cytologic smear of cervix (ASC-US) R87.610    Dyspareunia, female N94.10    Extramammary Paget's disease C44.99       Past Medical History:   Diagnosis Date    Arthritis     ASCUS with positive high risk HPV 11/5/2013    Sees Gyn    Esophagitis 6/18/2013    Has been on Omeprazole for a while now    Extramammary Paget's disease 2/26/2020    GERD (gastroesophageal reflux disease)     Hypercholesterolemia     PUD (peptic ulcer disease)     as a teenager       Current Outpatient Medications   Medication Sig Dispense Refill    ascorbic acid (VITAMIN C PO) Take  by mouth.  multivitamin (ONE A DAY) tablet Take 1 Tab by mouth daily. Social History     Tobacco Use   Smoking Status Never Smoker   Smokeless Tobacco Never Used       No Known Allergies    Patient Labs were reviewed: yes    Patient Past Records were reviewed: yes      Objective:     Vitals:    11/08/21 1333   BP: 111/60   Pulse: 60   Resp: 16   Temp: 98.4 °F (36.9 °C)   TempSrc: Temporal   SpO2: 98%   Weight: 90 lb (40.8 kg)   Height: 4' 10\" (1.473 m)     Body mass index is 18.81 kg/m². Exam:   Appearance: alert, well appearing,  oriented to person, place, and time, acyanotic, in no respiratory distress and well hydrated.   HEENT:  NC/AT, pink conj, anicteric sclerae  Neck:  No cervical lymphadenopathy, no JVD, no thyromegaly, no carotid bruit  Heart:  RRR without M/R/G  Lungs:  CTAB, no rhonchi, rales, or wheezes with good air exchange   Abdomen:  Non-tender, pos bowel sounds, no hepatosplenomegaly  Ext:  No C/C/E    Skin: no rash  Neuro: no lateralizing signs, CNs II-XII intact            I have discussed the diagnosis with the patient and the intended plan as seen in the above orders. The patient has received an After-Visit Summary and questions were answered concerning future plans. Medication Side Effects and Warnings were discussed with patient: yes    Patient verbalized understanding of above instructions.     Malik Wisdom MD  Internal Medicine  [facility]

## 2021-12-08 ENCOUNTER — PATIENT MESSAGE (OUTPATIENT)
Dept: FAMILY MEDICINE CLINIC | Age: 66
End: 2021-12-08

## 2021-12-08 DIAGNOSIS — Z12.31 ENCOUNTER FOR SCREENING MAMMOGRAM FOR MALIGNANT NEOPLASM OF BREAST: Primary | ICD-10-CM

## 2022-01-19 ENCOUNTER — VIRTUAL VISIT (OUTPATIENT)
Dept: FAMILY MEDICINE CLINIC | Age: 67
End: 2022-01-19
Payer: MEDICARE

## 2022-01-19 DIAGNOSIS — M81.0 OSTEOPOROSIS WITHOUT CURRENT PATHOLOGICAL FRACTURE, UNSPECIFIED OSTEOPOROSIS TYPE: ICD-10-CM

## 2022-01-19 DIAGNOSIS — D12.6 TUBULAR ADENOMA OF COLON: ICD-10-CM

## 2022-01-19 DIAGNOSIS — E78.5 HYPERLIPIDEMIA, UNSPECIFIED HYPERLIPIDEMIA TYPE: ICD-10-CM

## 2022-01-19 DIAGNOSIS — R87.610 ATYPICAL SQUAMOUS CELLS OF UNDETERMINED SIGNIFICANCE ON CYTOLOGIC SMEAR OF CERVIX (ASC-US): ICD-10-CM

## 2022-01-19 DIAGNOSIS — C44.99 EXTRAMAMMARY PAGET'S DISEASE: ICD-10-CM

## 2022-01-19 DIAGNOSIS — Z00.00 MEDICARE ANNUAL WELLNESS VISIT, SUBSEQUENT: Primary | ICD-10-CM

## 2022-01-19 PROCEDURE — G0439 PPPS, SUBSEQ VISIT: HCPCS | Performed by: INTERNAL MEDICINE

## 2022-01-19 PROCEDURE — G8427 DOCREV CUR MEDS BY ELIG CLIN: HCPCS | Performed by: INTERNAL MEDICINE

## 2022-01-19 PROCEDURE — 3017F COLORECTAL CA SCREEN DOC REV: CPT | Performed by: INTERNAL MEDICINE

## 2022-01-19 PROCEDURE — 1090F PRES/ABSN URINE INCON ASSESS: CPT | Performed by: INTERNAL MEDICINE

## 2022-01-19 PROCEDURE — 1101F PT FALLS ASSESS-DOCD LE1/YR: CPT | Performed by: INTERNAL MEDICINE

## 2022-01-19 PROCEDURE — G8536 NO DOC ELDER MAL SCRN: HCPCS | Performed by: INTERNAL MEDICINE

## 2022-01-19 PROCEDURE — G9899 SCRN MAM PERF RSLTS DOC: HCPCS | Performed by: INTERNAL MEDICINE

## 2022-01-19 PROCEDURE — G8510 SCR DEP NEG, NO PLAN REQD: HCPCS | Performed by: INTERNAL MEDICINE

## 2022-01-19 PROCEDURE — 99214 OFFICE O/P EST MOD 30 MIN: CPT | Performed by: INTERNAL MEDICINE

## 2022-01-19 PROCEDURE — G8420 CALC BMI NORM PARAMETERS: HCPCS | Performed by: INTERNAL MEDICINE

## 2022-01-19 NOTE — PROGRESS NOTES
Evita Fu is a 77 y.o. female who was seen by synchronous (real-time) audio-video technology on 1/19/2022 for Annual Wellness Visit and Dry Skin        Assessment & Plan:   Diagnoses and all orders for this visit:    1. Medicare annual wellness visit, subsequent-see note below    2. Hyperlipidemia, unspecified hyperlipidemia type-continue to watch diet    The 10-year ASCVD risk score (Shelton Arango, et al., 2013) is: 4.3%    Values used to calculate the score:      Age: 77 years      Sex: Female      Is Non- : No      Diabetic: No      Tobacco smoker: No      Systolic Blood Pressure: 489 mmHg      Is BP treated: No      HDL Cholesterol: 84 mg/dL      Total Cholesterol: 226 mg/dL    3. Osteoporosis without current pathological fracture, unspecified osteoporosis type-declines treatment; continue with daily Ca+d, walk for exercise    4. Extramammary Paget's disease-s/p excision, told no further follow up needed    5. Tubular adenoma of colon-repeat colonoscopy in 5 yrs    6.  Atypical squamous cells of undetermined significance on cytologic smear of cervix (ASC-US)-will obtain notes from Gyn to see if she needs further PAPs        Follow-up and Dispositions    · Return in about 6 months (around 7/19/2022) for follow up.       7      12  Subjective:     Health Maintenance Due   Topic Date Due    Breast Cancer Screen Mammogram -scheduled in Feb at [facility]; will do it at [facility]   03/08/2019    Pneumococcal 65+ years (2 of 2 - PPSV23) 01/14/2022    Medicare Yearly Exam -today 01/15/2022   Ok to repeat bone density      Lab Results   Component Value Date/Time    Cholesterol, total 226 (H) 10/29/2020 02:39 PM    HDL Cholesterol 84 10/29/2020 02:39 PM    LDL, calculated 124 (H) 10/29/2020 02:39 PM    VLDL, calculated 18 10/29/2020 02:39 PM    Triglyceride 88 10/29/2020 02:39 PM   The 10-year ASCVD risk score (Shelton Aarngo, et al., 2013) is: 4.3%    Values used to calculate the score:      Age: 77 years      Sex: Female      Is Non- : No      Diabetic: No      Tobacco smoker: No      Systolic Blood Pressure: 799 mmHg      Is BP treated: No      HDL Cholesterol: 84 mg/dL      Total Cholesterol: 226 mg/dL     Dry skin-thick patches of scales on her face (eyebrows and forehead)  No oTC med use thoguht it was from the cold weather  Stopped Olay and switched to one that says for dry skin    Last Dexa: 2015, has declined treatment  IMPRESSION:   1.  Bone mineral density measures consistent with osteoporosis. Fracture risk  is high. Treatment, if not already started, should be considered.     2. No statistically significant interval change in spine or bilateral hip bone  mineral densities compared to the prior study.     Dual femur FRAX score - 10 year probability of fracture:     Major Osteoporotic = 4.8%     Hip = 0.8%    Released from surgery    Gyn follow up-will review if she needs another PAP; was told all is ok      Prior to Admission medications    Medication Sig Start Date End Date Taking? Authorizing Provider   ascorbic acid (VITAMIN C PO) Take  by mouth. Yes Provider, Historical   multivitamin (ONE A DAY) tablet Take 1 Tab by mouth daily. Yes Provider, Historical     Patient Active Problem List    Diagnosis Date Noted    Extramammary Paget's disease 02/26/2020    Dyspareunia, female 02/12/2016    Atypical squamous cells of undetermined significance on cytologic smear of cervix (ASC-US) 11/05/2013    Osteoporosis 07/01/2013    Vitamin D deficiency 06/18/2013    Hyperlipidemia 06/18/2013       ROS  Pt denies: Wt loss, Fever/Chills, HA, Visual changes, Fatigue, Chest pain, SOB, FRANKLIN, Abd pain, N/V/D/C, Blood in stool or urine, Edema. Pertinent positive as above in HPI. All others were negative  Objective:   No flowsheet data found.    General: alert, cooperative, no distress   Mental  status: normal mood, behavior, speech, dress, motor activity, and thought processes, able to follow commands   HENT: NCAT   Neck: no visualized mass   Resp: no respiratory distress   Neuro: no gross deficits   Skin: no discoloration or lesions of concern on visible areas   Psychiatric: normal affect, consistent with stated mood, no evidence of hallucinations     Additional exam findings: none      We discussed the expected course, resolution and complications of the diagnosis(es) in detail. Medication risks, benefits, costs, interactions, and alternatives were discussed as indicated. I advised her to contact the office if her condition worsens, changes or fails to improve as anticipated. She expressed understanding with the diagnosis(es) and plan. Andrea Blackburn, was evaluated through a synchronous (real-time) audio-video encounter. The patient (or guardian if applicable) is aware that this is a billable service, which includes applicable co-pays. Verbal consent to proceed has been obtained. The visit was conducted pursuant to the emergency declaration under the [address] [place] [address] authority and the [employer] and [employer] General Act. Patient identification was verified, and a caregiver was present when appropriate. The patient was located in a state where the provider was licensed to provide care. Oriana Arroyo MD  ;  This is the Subsequent Medicare Annual Wellness Exam, performed 12 months or more after the Initial AWV or the last Subsequent AWV    I have reviewed the patient's medical history in detail and updated the computerized patient record.         Assessment/Plan   Education and counseling provided:  Are appropriate based on today's review and evaluation  End-of-Life planning (with patient's consent)-primary decision maker identified  Pneumococcal Vaccine  Influenza Vaccine-done  Screening Mammography-ordered  Colorectal cancer screening tests-up to date  Cardiovascular screening blood test-repeat lipids next visit  Bone mass measurement (DEXA)-done  Screening for glaucoma-wears eyeglasses; has an appt soon  Diabetes screening test-FBS next visit    1. Medicare annual wellness visit, subsequent-Refer to above for plan and to patient instructions for recommendations on HM    Pneumovax 23 next visit     RTC yearly for wellness visit    Depression Risk Factor Screening:     3 most recent PHQ Screens 1/9/2022   Little interest or pleasure in doing things Not at all   Feeling down, depressed, irritable, or hopeless Not at all   Total Score PHQ 2 0       Alcohol & Drug Abuse Risk Screen    Do you average more than 1 drink per night or more than 7 drinks a week:  No    On any one occasion in the past three months have you have had more than 3 drinks containing alcohol:  No          Functional Ability and Level of Safety:    Hearing: Hearing is good. Activities of Daily Living: The home contains: handrails and grab bars  Patient does total self care      Ambulation: with no difficulty     Fall Risk:  Fall Risk Assessment, last 12 mths 1/19/2022   Able to walk? Yes   Fall in past 12 months? 0   Do you feel unsteady?  0   Are you worried about falling 0      Abuse Screen:  Patient is not abused       Cognitive Screening    Has your family/caregiver stated any concerns about your memory: no    Cognitive Screening: Normal - not needed    Health Maintenance Due     Health Maintenance Due   Topic Date Due    Breast Cancer Screen Mammogram  03/08/2019    Pneumococcal 65+ years (2 of 2 - PPSV23) 01/14/2022       Patient Care Team   Patient Care Team:  Estella Cho MD as PCP - General (Internal Medicine)  Estella Cho MD as PCP - [facility] Empaneled Provider  Ronen Shafer DO (Obstetrics & Gynecology)  Maddie Kaufman MD (Gastroenterology)    History     Patient Active Problem List   Diagnosis Code    Vitamin D deficiency E55.9    Hyperlipidemia E78.5    Osteoporosis M81.0    Atypical squamous cells of undetermined significance on cytologic smear of cervix (ASC-US) R87.610    Dyspareunia, female N94.10    Extramammary Paget's disease C44.99     Past Medical History:   Diagnosis Date    Arthritis     ASCUS with positive high risk HPV 11/5/2013    Sees Gyn    Esophagitis 6/18/2013    Has been on Omeprazole for a while now    Extramammary Paget's disease 2/26/2020    GERD (gastroesophageal reflux disease)     Hypercholesterolemia     PUD (peptic ulcer disease)     as a teenager      Past Surgical History:   Procedure Laterality Date    HX COLONOSCOPY  2010    found polyps    HX COLPOSCOPY  08/2013    HX GI      HX GYN       Current Outpatient Medications   Medication Sig Dispense Refill    ascorbic acid (VITAMIN C PO) Take  by mouth.  multivitamin (ONE A DAY) tablet Take 1 Tab by mouth daily. No Known Allergies    Family History   Problem Relation Age of Onset    Hypertension Mother     Stroke Father      Social History     Tobacco Use    Smoking status: Never Smoker    Smokeless tobacco: Never Used   Substance Use Topics    Alcohol use: No       [facility], was evaluated through a synchronous (real-time) audio-video encounter. The patient (or guardian if applicable) is aware that this is a billable service, which includes applicable co-pays. Verbal consent to proceed has been obtained. The visit was conducted pursuant to the emergency declaration under the [facility], [address] [place] [address] waiver authority and the [employer] and [employer] General Act. Patient identification was verified, and a caregiver was present when appropriate. The patient was located in a state where the provider was licensed to provide care.        Caterina Morse MD

## 2022-01-19 NOTE — PATIENT INSTRUCTIONS

## 2022-01-19 NOTE — PROGRESS NOTES
Patient seen for medicare wellness routine visit with concerns of dry skin on face, with itchiness and redness. OTC products ineffective. 1. Have you been to the ER, urgent care clinic since your last visit? Hospitalized since your last visit? No    2. Have you seen or consulted any other health care providers outside of the [address] [place] [address] since your last visit? Include any pap smears or colon screening.  No    Health Maintenance Due   Topic Date Due    Breast Cancer Screen Mammogram  03/08/2019    Pneumococcal 65+ years (2 of 2 - PPSV23) 01/14/2022    Medicare Yearly Exam  01/15/2022

## 2022-02-28 ENCOUNTER — CLINICAL SUPPORT (OUTPATIENT)
Dept: FAMILY MEDICINE CLINIC | Age: 67
End: 2022-02-28
Payer: MEDICARE

## 2022-02-28 DIAGNOSIS — Z23 ENCOUNTER FOR IMMUNIZATION: Primary | ICD-10-CM

## 2022-02-28 PROCEDURE — G0009 ADMIN PNEUMOCOCCAL VACCINE: HCPCS | Performed by: INTERNAL MEDICINE

## 2022-02-28 PROCEDURE — 90732 PPSV23 VACC 2 YRS+ SUBQ/IM: CPT | Performed by: INTERNAL MEDICINE

## 2022-02-28 NOTE — PROGRESS NOTES
Patient was given VIS for review,consent was obtained and per orders of Dr. Nay Jefferson, injection of PPSV23 given by Sera Woodward LPN. Patient observed. No signs nor symptoms of any adverse reactions. Patient tolerated injection well.

## 2022-03-16 ENCOUNTER — OFFICE VISIT (OUTPATIENT)
Dept: FAMILY MEDICINE CLINIC | Age: 67
End: 2022-03-16
Payer: MEDICARE

## 2022-03-16 VITALS
WEIGHT: 91.6 LBS | HEART RATE: 61 BPM | SYSTOLIC BLOOD PRESSURE: 128 MMHG | BODY MASS INDEX: 19.23 KG/M2 | TEMPERATURE: 98.4 F | HEIGHT: 58 IN | OXYGEN SATURATION: 97 % | RESPIRATION RATE: 16 BRPM | DIASTOLIC BLOOD PRESSURE: 68 MMHG

## 2022-03-16 DIAGNOSIS — Z01.818 PREOPERATIVE CLEARANCE: Primary | ICD-10-CM

## 2022-03-16 DIAGNOSIS — M81.0 OSTEOPOROSIS WITHOUT CURRENT PATHOLOGICAL FRACTURE, UNSPECIFIED OSTEOPOROSIS TYPE: ICD-10-CM

## 2022-03-16 DIAGNOSIS — H25.9 AGE-RELATED CATARACT OF BOTH EYES, UNSPECIFIED AGE-RELATED CATARACT TYPE: ICD-10-CM

## 2022-03-16 DIAGNOSIS — E55.9 VITAMIN D DEFICIENCY: ICD-10-CM

## 2022-03-16 DIAGNOSIS — E78.5 HYPERLIPIDEMIA, UNSPECIFIED HYPERLIPIDEMIA TYPE: ICD-10-CM

## 2022-03-16 DIAGNOSIS — D12.6 TUBULAR ADENOMA OF COLON: ICD-10-CM

## 2022-03-16 DIAGNOSIS — C44.99 EXTRAMAMMARY PAGET'S DISEASE: ICD-10-CM

## 2022-03-16 PROCEDURE — 1101F PT FALLS ASSESS-DOCD LE1/YR: CPT | Performed by: INTERNAL MEDICINE

## 2022-03-16 PROCEDURE — G8432 DEP SCR NOT DOC, RNG: HCPCS | Performed by: INTERNAL MEDICINE

## 2022-03-16 PROCEDURE — G8427 DOCREV CUR MEDS BY ELIG CLIN: HCPCS | Performed by: INTERNAL MEDICINE

## 2022-03-16 PROCEDURE — G9899 SCRN MAM PERF RSLTS DOC: HCPCS | Performed by: INTERNAL MEDICINE

## 2022-03-16 PROCEDURE — G8420 CALC BMI NORM PARAMETERS: HCPCS | Performed by: INTERNAL MEDICINE

## 2022-03-16 PROCEDURE — 3017F COLORECTAL CA SCREEN DOC REV: CPT | Performed by: INTERNAL MEDICINE

## 2022-03-16 PROCEDURE — G8536 NO DOC ELDER MAL SCRN: HCPCS | Performed by: INTERNAL MEDICINE

## 2022-03-16 PROCEDURE — 99214 OFFICE O/P EST MOD 30 MIN: CPT | Performed by: INTERNAL MEDICINE

## 2022-03-16 PROCEDURE — 1090F PRES/ABSN URINE INCON ASSESS: CPT | Performed by: INTERNAL MEDICINE

## 2022-03-16 NOTE — PROGRESS NOTES
Patient seen for pre op exam Right cataract 3/31/22    Health Maintenance Due   Topic Date Due    Breast Cancer Screen Mammogram  03/08/2019     1. \"Have you been to the ER, urgent care clinic since your last visit? Hospitalized since your last visit? \" No    2. \"Have you seen or consulted any other health care providers outside of the [address] [place] [address] since your last visit? \" Yes Optometry     3. For patients aged 39-70: Has the patient had a colonoscopy / FIT/ Cologuard? Yes - no Care Gap present      If the patient is female:    4. For patients aged 41-77: Has the patient had a mammogram within the past 2 years? Yes - Care Gap present. Most recent result on file      5. For patients aged 21-65: Has the patient had a pap smear?  NA - based on age or sex

## 2022-03-16 NOTE — PROGRESS NOTES
Preoperative Evaluation    Date of Exam: 3/16/2022    Dar Acosta is a 77 y.o. female (:1955) who presents for preoperative evaluation for cataract surgery under topical/MAC anesthesia on 3/31/2022 with Dr Wei Cordova    Latex Allergy: no    Problem List:     Patient Active Problem List    Diagnosis Date Noted    Extramammary Paget's disease 2020    Dyspareunia, female 2016    Atypical squamous cells of undetermined significance on cytologic smear of cervix (ASC-US) 2013    Osteoporosis 2013    Vitamin D deficiency 2013    Hyperlipidemia 2013       Anesthesia Complications: None  History of abnormal bleeding : None  History of Blood Transfusions: no  Health Care Directive or Living Will: no    Objective:     Hard time seeing at night [place] with night driving    Wt Readings from Last 3 Encounters:   22 91 lb 9.6 oz (41.5 kg)   21 90 lb (40.8 kg)   21 91 lb 9.6 oz (41.5 kg)       BP Readings from Last 3 Encounters:   22 128/68   21 111/60   21 124/65     Fasting today for labs    Lab Results   Component Value Date/Time    Cholesterol, total 226 (H) 10/29/2020 02:39 PM    HDL Cholesterol 84 10/29/2020 02:39 PM    LDL, calculated 124 (H) 10/29/2020 02:39 PM    VLDL, calculated 18 10/29/2020 02:39 PM    Triglyceride 88 10/29/2020 02:39 PM     The 10-year ASCVD risk score (Rika Duenas et al., 2013) is: 5.6%    Values used to calculate the score:      Age: 77 years      Sex: Female      Is Non- : No      Diabetic: No      Tobacco smoker: No      Systolic Blood Pressure: 758 mmHg      Is BP treated: No      HDL Cholesterol: 84 mg/dL      Total Cholesterol: 226 mg/dL     Declines meds for osteoporosis      Health Maintenance Due   Topic Date Due    Breast Cancer Screen Mammogram -prev ordered, advised to do this shortly 2019     ROS:   Feeling well. No dyspnea or chest pain on exertion.   No abdominal pain, change in bowel habits, black or bloody stools. No urinary tract symptoms. GYN ROS: no breast pain or new or enlarging lumps on self exam. No neurological complaints. OBJECTIVE:   The patient appears well, alert, oriented x 3, in no distress. Visit Vitals  /68   Pulse 61   Temp 98.4 °F (36.9 °C) (Temporal)   Resp 16   Ht 4' 10\" (1.473 m)   Wt 91 lb 9.6 oz (41.5 kg)   SpO2 97%   BMI 19.14 kg/m²     HEENT:bilateral lens opacity  ENT normal.  Neck supple. No adenopathy or thyromegaly. GEOVANNY. Chest: Lungs are clear, good air entry, no wheezes, rhonchi or rales. Cardiovascular: S1 and S2 normal, no murmurs, regular rate and rhythm. Abdomen: soft without tenderness, guarding, mass or organomegaly. Extremities: show no edema, normal peripheral pulses. Neurological: is normal, no focal findings. Diagnoses and all orders for this visit:    1. Preoperative clearance for cataract surgery-medically cleared  -     CBC WITH AUTOMATED DIFF; Future  -     METABOLIC PANEL, COMPREHENSIVE; Future  Low risk for planned surgery  No contraindications to planned surgery    2. Age-related cataract of both eyes, unspecified age-related cataract type-symptomatic with night driving    3. Hyperlipidemia, unspecified hyperlipidemia type-low cholesterol diet advised  and will determine ASCVD risk with next labs to see if she will benefit from taking a statin  -     LIPID PANEL; Future    4. Vitamin D deficiency-continue with OTC Ca+D  -     VITAMIN D, 25 HYDROXY; Future    5. Tubular adenoma of colon-colonoscopy was last done 8/2011; declined to do another colonoscopy but cologuard was negative on 7/2020     6. Extramammary Paget's disease-s/p surgery    7. Osteoporosis without current pathological fracture, unspecified osteoporosis type-daily ca+d.  Walk for exercise; declines treatment        Rosario Moran MD   3/16/2022

## 2022-03-16 NOTE — PATIENT INSTRUCTIONS
Cataract Surgery: Before Your Surgery  What is cataract surgery? Cataracts are cloudy areas in the lens of your eye. Your lens is behind the colored part of your eye (iris). Its job is to focus light onto the back of your eye. In some people, cataracts prevent light from reaching the back of the eye. This can cause vision problems. Cataract surgery helps you see better. It replaces your natural lens, which has become cloudy, with a clear artificial one. There are several types of cataract surgery. They include:  · Phacoemulsification. This is the most common type. The doctor makes a small cut (incision) in your eye. A special ultrasound tool is used to break your cloudy lens apart. Then the small pieces of the lens are removed and replaced with an artificial lens. Most people do not need stitches, because the incision is so small. · Extracapsular extraction. This uses a larger incision to remove the lens in one piece. It is replaced with an artificial lens. And the cut is stitched. · Femtosecond laser-assisted cataract surgery (FLACS). This uses laser technology and replaces the natural lens with an artificial lens. Before surgery, you may be given medicine to help you relax. Medicine will be used to numb your eye. The surgery takes about 20 to 40 minutes. After surgery, you may have a bandage or shield on your eye. You will probably go home from surgery after 1 hour in the recovery room. You may be able to go back to work or your normal routine in a few days. Most people see better in 1 to 3 days. It could take 3 to 10 weeks for your eye to completely heal. After your eye heals, you may still need to wear glasses, especially for reading. How do you prepare for surgery? Surgery can be stressful. This information will help you understand what you can expect. And it will help you safely prepare for surgery. Preparing for surgery    · Be sure you have someone to take you home.  Anesthesia and pain medicine will make it unsafe for you to drive or get home on your own.     · Understand exactly what surgery is planned, along with the risks, benefits, and other options.     · If you take aspirin or some other blood thinner, ask your doctor if you should stop taking it before your surgery. Make sure that you understand exactly what your doctor wants you to do. These medicines increase the risk of bleeding.     · Tell your doctor ALL the medicines, vitamins, supplements, and herbal remedies you take. Some may increase the risk of problems during your surgery. Your doctor will tell you if you should stop taking any of them before the surgery and how soon to do it.     · Make sure your doctor and the hospital have a copy of your advance directive. If you don't have one, you may want to prepare one. It lets others know your health care wishes. It's a good thing to have before any type of surgery or procedure. What happens on the day of surgery? · Follow the instructions exactly about when to stop eating and drinking. If you don't, your surgery may be canceled. If your doctor told you to take your medicines on the day of surgery, take them with only a sip of water.     · Take a bath or shower before you come in for your surgery. Do not apply lotions, perfumes, deodorants, or nail polish.     · Take off all jewelry and piercings. At the hospital or surgery center   · Bring a picture ID.     · The area for surgery is often marked to make sure there are no errors.     · You will be kept comfortable and safe by your anesthesia provider. The anesthesia may make you sleep. Or it may just numb the area being worked on.     · The surgery will take about 20 to 40 minutes. When should you call your doctor?    · You have questions or concerns.     · You don't understand how to prepare for your surgery.     · You become ill before the surgery (such as fever, flu, or a cold).     · You need to reschedule or have changed your mind about having the surgery. Where can you learn more? Go to http://www.gray.com/  Enter K474 in the search box to learn more about \"Cataract Surgery: Before Your Surgery. \"  Current as of: January 24, 2022               Content Version: 13.2  © 9422-4456 [employer]wise, [employer]orporated. Care instructions adapted under license by [employer] (which disclaims liability or warranty for this information). If you have questions about a medical condition or this instruction, always ask your healthcare professional. [address] any warranty or liability for your use of this information.

## 2022-03-17 LAB
25(OH)D3+25(OH)D2 SERPL-MCNC: 23.4 NG/ML (ref 30–100)
ALBUMIN SERPL-MCNC: 4.6 G/DL (ref 3.8–4.8)
ALBUMIN/GLOB SERPL: 1.7 {RATIO} (ref 1.2–2.2)
ALP SERPL-CCNC: 62 IU/L (ref 44–121)
ALT SERPL-CCNC: 10 IU/L (ref 0–32)
AST SERPL-CCNC: 19 IU/L (ref 0–40)
BASOPHILS # BLD AUTO: 0 X10E3/UL (ref 0–0.2)
BASOPHILS NFR BLD AUTO: 1 %
BILIRUB SERPL-MCNC: 0.5 MG/DL (ref 0–1.2)
BUN SERPL-MCNC: 9 MG/DL (ref 8–27)
BUN/CREAT SERPL: 15 (ref 12–28)
CALCIUM SERPL-MCNC: 9.2 MG/DL (ref 8.7–10.3)
CHLORIDE SERPL-SCNC: 98 MMOL/L (ref 96–106)
CHOLEST SERPL-MCNC: 251 MG/DL (ref 100–199)
CO2 SERPL-SCNC: 24 MMOL/L (ref 20–29)
CREAT SERPL-MCNC: 0.61 MG/DL (ref 0.57–1)
EGFR: 99 ML/MIN/1.73
EOSINOPHIL # BLD AUTO: 0 X10E3/UL (ref 0–0.4)
EOSINOPHIL NFR BLD AUTO: 1 %
ERYTHROCYTE [DISTWIDTH] IN BLOOD BY AUTOMATED COUNT: 13.1 % (ref 11.7–15.4)
GLOBULIN SER CALC-MCNC: 2.7 G/DL (ref 1.5–4.5)
GLUCOSE SERPL-MCNC: 88 MG/DL (ref 65–99)
HCT VFR BLD AUTO: 38.2 % (ref 34–46.6)
HDLC SERPL-MCNC: 90 MG/DL
HGB BLD-MCNC: 13 G/DL (ref 11.1–15.9)
IMM GRANULOCYTES # BLD AUTO: 0 X10E3/UL (ref 0–0.1)
IMM GRANULOCYTES NFR BLD AUTO: 0 %
IMP & REVIEW OF LAB RESULTS: NORMAL
INTERPRETATION: NORMAL
LDLC SERPL CALC-MCNC: 152 MG/DL (ref 0–99)
LYMPHOCYTES # BLD AUTO: 1 X10E3/UL (ref 0.7–3.1)
LYMPHOCYTES NFR BLD AUTO: 33 %
MCH RBC QN AUTO: 29.4 PG (ref 26.6–33)
MCHC RBC AUTO-ENTMCNC: 34 G/DL (ref 31.5–35.7)
MCV RBC AUTO: 86 FL (ref 79–97)
MONOCYTES # BLD AUTO: 0.2 X10E3/UL (ref 0.1–0.9)
MONOCYTES NFR BLD AUTO: 8 %
NEUTROPHILS # BLD AUTO: 1.8 X10E3/UL (ref 1.4–7)
NEUTROPHILS NFR BLD AUTO: 57 %
PLATELET # BLD AUTO: 210 X10E3/UL (ref 150–450)
POTASSIUM SERPL-SCNC: 4.2 MMOL/L (ref 3.5–5.2)
PROT SERPL-MCNC: 7.3 G/DL (ref 6–8.5)
RBC # BLD AUTO: 4.42 X10E6/UL (ref 3.77–5.28)
SODIUM SERPL-SCNC: 137 MMOL/L (ref 134–144)
TRIGL SERPL-MCNC: 56 MG/DL (ref 0–149)
VLDLC SERPL CALC-MCNC: 9 MG/DL (ref 5–40)
WBC # BLD AUTO: 3.1 X10E3/UL (ref 3.4–10.8)

## 2022-03-19 PROBLEM — C44.99: Status: ACTIVE | Noted: 2020-02-26

## 2022-03-21 ENCOUNTER — PATIENT MESSAGE (OUTPATIENT)
Dept: FAMILY MEDICINE CLINIC | Age: 67
End: 2022-03-21

## 2022-03-24 PROBLEM — H25.9 AGE-RELATED CATARACT OF BOTH EYES: Status: ACTIVE | Noted: 2022-03-16

## 2022-03-24 PROBLEM — D12.6 TUBULAR ADENOMA OF COLON: Status: ACTIVE | Noted: 2022-03-16

## 2022-04-07 DIAGNOSIS — E55.9 VITAMIN D DEFICIENCY: Primary | ICD-10-CM

## 2022-04-07 RX ORDER — ERGOCALCIFEROL 1.25 MG/1
50000 CAPSULE ORAL
Qty: 12 CAPSULE | Refills: 1 | Status: SHIPPED | OUTPATIENT
Start: 2022-04-07 | End: 2022-10-17 | Stop reason: SDUPTHER

## 2022-04-08 NOTE — PROGRESS NOTES
The 10-year ASCVD risk score (Sandra Rangel et al., 2013) is: 5.7%    Values used to calculate the score:      Age: 77 years      Sex: Female      Is Non- : No      Diabetic: No      Tobacco smoker: No      Systolic Blood Pressure: 042 mmHg      Is BP treated: No      HDL Cholesterol: 90 mg/dL      Total Cholesterol: 251 mg/dL

## 2022-09-15 ENCOUNTER — OFFICE VISIT (OUTPATIENT)
Dept: FAMILY MEDICINE CLINIC | Age: 67
End: 2022-09-15
Payer: MEDICARE

## 2022-09-15 VITALS
HEIGHT: 58 IN | HEART RATE: 60 BPM | RESPIRATION RATE: 16 BRPM | WEIGHT: 91.6 LBS | BODY MASS INDEX: 19.23 KG/M2 | OXYGEN SATURATION: 96 % | TEMPERATURE: 98.2 F | DIASTOLIC BLOOD PRESSURE: 61 MMHG | SYSTOLIC BLOOD PRESSURE: 114 MMHG

## 2022-09-15 DIAGNOSIS — Z78.0 POSTMENOPAUSAL STATE: ICD-10-CM

## 2022-09-15 DIAGNOSIS — M81.0 OSTEOPOROSIS WITHOUT CURRENT PATHOLOGICAL FRACTURE, UNSPECIFIED OSTEOPOROSIS TYPE: ICD-10-CM

## 2022-09-15 DIAGNOSIS — Z12.11 SCREENING FOR COLON CANCER: ICD-10-CM

## 2022-09-15 DIAGNOSIS — Z12.31 ENCOUNTER FOR SCREENING MAMMOGRAM FOR MALIGNANT NEOPLASM OF BREAST: ICD-10-CM

## 2022-09-15 DIAGNOSIS — Z23 ENCOUNTER FOR IMMUNIZATION: ICD-10-CM

## 2022-09-15 DIAGNOSIS — D12.6 TUBULAR ADENOMA OF COLON: ICD-10-CM

## 2022-09-15 DIAGNOSIS — E55.9 VITAMIN D DEFICIENCY: ICD-10-CM

## 2022-09-15 DIAGNOSIS — R20.2 PARESTHESIA: ICD-10-CM

## 2022-09-15 DIAGNOSIS — E78.5 HYPERLIPIDEMIA, UNSPECIFIED HYPERLIPIDEMIA TYPE: Primary | ICD-10-CM

## 2022-09-15 DIAGNOSIS — Z80.0 FAMILY HISTORY OF COLON CANCER: ICD-10-CM

## 2022-09-15 PROCEDURE — 1101F PT FALLS ASSESS-DOCD LE1/YR: CPT | Performed by: INTERNAL MEDICINE

## 2022-09-15 PROCEDURE — G0008 ADMIN INFLUENZA VIRUS VAC: HCPCS | Performed by: INTERNAL MEDICINE

## 2022-09-15 PROCEDURE — 90694 VACC AIIV4 NO PRSRV 0.5ML IM: CPT | Performed by: INTERNAL MEDICINE

## 2022-09-15 PROCEDURE — G9899 SCRN MAM PERF RSLTS DOC: HCPCS | Performed by: INTERNAL MEDICINE

## 2022-09-15 PROCEDURE — 1090F PRES/ABSN URINE INCON ASSESS: CPT | Performed by: INTERNAL MEDICINE

## 2022-09-15 PROCEDURE — G8420 CALC BMI NORM PARAMETERS: HCPCS | Performed by: INTERNAL MEDICINE

## 2022-09-15 PROCEDURE — 1123F ACP DISCUSS/DSCN MKR DOCD: CPT | Performed by: INTERNAL MEDICINE

## 2022-09-15 PROCEDURE — 99214 OFFICE O/P EST MOD 30 MIN: CPT | Performed by: INTERNAL MEDICINE

## 2022-09-15 PROCEDURE — G8510 SCR DEP NEG, NO PLAN REQD: HCPCS | Performed by: INTERNAL MEDICINE

## 2022-09-15 PROCEDURE — G8536 NO DOC ELDER MAL SCRN: HCPCS | Performed by: INTERNAL MEDICINE

## 2022-09-15 PROCEDURE — 3017F COLORECTAL CA SCREEN DOC REV: CPT | Performed by: INTERNAL MEDICINE

## 2022-09-15 PROCEDURE — G8427 DOCREV CUR MEDS BY ELIG CLIN: HCPCS | Performed by: INTERNAL MEDICINE

## 2022-09-15 NOTE — PROGRESS NOTES
Patient seen for routine follow up     Health Maintenance Due   Topic Date Due    Breast Cancer Screen Mammogram  03/08/2019    Colorectal Cancer Screening Combo  05/03/2022    COVID-19 Vaccine (4 - Booster for Moderna series) 05/14/2022    Flu Vaccine (1) 09/01/2022     1. \"Have you been to the ER, urgent care clinic since your last visit? Hospitalized since your last visit? \" No    2. \"Have you seen or consulted any other health care providers outside of the [address] [place] [address] since your last visit? \" No     3. For patients aged 39-70: Has the patient had a colonoscopy / FIT/ Cologuard? Yes - Care Gap present. Most recent result on file      If the patient is female:    4. For patients aged 41-77: Has the patient had a mammogram within the past 2 years? Yes - Care Gap present. Most recent result on file      5. For patients aged 21-65: Has the patient had a pap smear? NA - based on age or sex    Patient was given VIS for review,consent was obtained and per orders of Dr. Bob Mcnair, [facility] Patient observed. No signs nor symptoms of any adverse reactions. Patient tolerated injection well.

## 2022-09-15 NOTE — PROGRESS NOTES
Assessment/ Plan:   Diagnoses and all orders for this visit:    1. Hyperlipidemia, unspecified hyperlipidemia type-low cholesterol diet advised  and will determine ASCVD risk with next labs to see if she will benefit from taking a statin   -     LIPID PANEL; Future    2. Vitamin D deficiency-on rx  -     VITAMIN D, 25 HYDROXY; Future    3. Paresthesia-intermittent on different parts of the limbs  -     TSH 3RD GENERATION; Future  -     VITAMIN B12; Future  -     METABOLIC PANEL, COMPREHENSIVE; Future    4. Osteoporosis without current pathological fracture, unspecified osteoporosis type-declined tx previously; repeat Dexa and she will decide after; continue with daily Ca+D, walk for exercise  -     DEXA BONE DENSITY STUDY AXIAL; Future    5. Tubular adenoma of colon-needs follow up colonoscopy  -     REFERRAL TO GASTROENTEROLOGY    6. Family history of colon cancer-sister dx with rectal cancer recently  -     REFERRAL TO GASTROENTEROLOGY    7. Screening for colon cancer  -     REFERRAL TO GASTROENTEROLOGY    8. Encounter for screening mammogram for malignant neoplasm of breast  -     ELEAZAR MAMMO BI SCREENING INCL CAD; Future    9. Postmenopausal state  -     DEXA BONE DENSITY STUDY AXIAL; Future    10. Encounter for immunization  -     INFLUENZA, FLUAD, (AGE 72 Y+), IM, PF, 0.5 ML      Follow-up and Dispositions    Return in about 6 months (around 3/15/2023) for follow up, annual wellness.                        Chief Complaint   Patient presents with    Follow Up Chronic Condition       Pt is a 79y.o. year old female who presents for follow up of her chronic medical problems    Health Maintenance Due   Topic Date Due    Breast Cancer Screen Mammogram  03/08/2019-ordered    Colorectal Cancer Screening Combo  05/03/2022-Dr Mathis at [address] [facility] Vaccine (4 - Booster for Moderna series) 05/14/2022-advised to get this new booster      Wt Readings from Last 3 Encounters:   09/15/22 91 lb 9.6 oz (41.5 kg) 03/16/22 91 lb 9.6 oz (41.5 kg)   11/08/21 90 lb (40.8 kg)        BP Readings from Last 3 Encounters:   09/15/22 114/61   03/16/22 128/68   11/08/21 111/60      Lab Results   Component Value Date/Time    Cholesterol, total 251 (H) 03/16/2022 11:54 AM    HDL Cholesterol 90 03/16/2022 11:54 AM    LDL, calculated 152 (H) 03/16/2022 11:54 AM    LDL, calculated 124 (H) 10/29/2020 02:39 PM    VLDL, calculated 9 03/16/2022 11:54 AM    VLDL, calculated 18 10/29/2020 02:39 PM    Triglyceride 56 03/16/2022 11:54 AM   .Fasting? The 10-year ASCVD risk score (Jamison Gómez, et al., 2013) is: 5.2%    Values used to calculate the score:      Age: 79 years      Sex: Female      Is Non- : No      Diabetic: No      Tobacco smoker: No      Systolic Blood Pressure: 335 mmHg      Is BP treated: No      HDL Cholesterol: 90 mg/dL      Total Cholesterol: 251 mg/dL       Lab Results   Component Value Date/Time    VITAMIN D, 25-HYDROXY 23.4 (L) 03/16/2022 11:54 AM      On rx-forgets; got second refill    Osteoporosis-ambivalent about starting Prolia; discussed other available meds    ROS:    Pt denies: Wt loss, Fever/Chills, HA, Visual changes, Fatigue, Chest pain, SOB, FRANKLIN, Abd pain, N/V/D/C, Blood in stool or urine, Edema. Pertinent positive as above in HPI.  All others were negative    Patient Active Problem List   Diagnosis Code    Vitamin D deficiency E55.9    Hyperlipidemia E78.5    Osteoporosis without current pathological fracture M81.0    Atypical squamous cells of undetermined significance on cytologic smear of cervix (ASC-US) R87.610    Dyspareunia, female N94.10    Extramammary Paget's disease C44.99    Tubular adenoma of colon D12.6    Age-related cataract of both eyes H25.9       Past Medical History:   Diagnosis Date    Arthritis     ASCUS with positive high risk HPV 11/5/2013    Sees Gyn    Esophagitis 6/18/2013    Has been on Omeprazole for a while now    Extramammary Paget's disease 2/26/2020 GERD (gastroesophageal reflux disease)     Hypercholesterolemia     PUD (peptic ulcer disease)     as a teenager       Current Outpatient Medications   Medication Sig Dispense Refill    ergocalciferol (ERGOCALCIFEROL) 1,250 mcg (50,000 unit) capsule Take 1 Capsule by mouth every seven (7) days. 12 Capsule 1    ascorbic acid (VITAMIN C PO) Take  by mouth.      multivitamin (ONE A DAY) tablet Take 1 Tab by mouth daily. Social History     Tobacco Use   Smoking Status Never   Smokeless Tobacco Never       No Known Allergies    Patient Labs were reviewed: yes    Patient Past Records were reviewed: yes      Objective:     Vitals:    09/15/22 1118   BP: 114/61   Pulse: 60   Resp: 16   Temp: 98.2 °F (36.8 °C)   TempSrc: Temporal   SpO2: 96%   Weight: 91 lb 9.6 oz (41.5 kg)   Height: 4' 10\" (1.473 m)     Body mass index is 19.14 kg/m². Exam:   Appearance: alert, well appearing,  oriented to person, place, and time, acyanotic, in no respiratory distress and well hydrated. HEENT:  NC/AT, pink conj, anicteric sclerae  Neck:  No cervical lymphadenopathy, no JVD, no thyromegaly, no carotid bruit  Heart:  RRR without M/R/G  Lungs:  CTAB, no rhonchi, rales, or wheezes with good air exchange   Abdomen:  Non-tender, pos bowel sounds, no hepatosplenomegaly  Ext:  No C/C/E    Skin: no rash  Neuro: no lateralizing signs, CNs II-XII intact            I have discussed the diagnosis with the patient and the intended plan as seen in the above orders. The patient has received an After-Visit Summary and questions were answered concerning future plans. Medication Side Effects and Warnings were discussed with patient: yes    Patient verbalized understanding of above instructions.     Maggi Sepulveda MD  Internal Medicine  [facility]

## 2022-09-15 NOTE — Clinical Note
Snow, I may have forgotten to put in that she wants to see Dr Danish Cho for the follow up colonoscopy

## 2022-09-16 LAB
25(OH)D3+25(OH)D2 SERPL-MCNC: 40.8 NG/ML (ref 30–100)
ALBUMIN SERPL-MCNC: 4.7 G/DL (ref 3.8–4.8)
ALBUMIN/GLOB SERPL: 2 {RATIO} (ref 1.2–2.2)
ALP SERPL-CCNC: 64 IU/L (ref 44–121)
ALT SERPL-CCNC: 11 IU/L (ref 0–32)
AST SERPL-CCNC: 20 IU/L (ref 0–40)
BILIRUB SERPL-MCNC: 0.4 MG/DL (ref 0–1.2)
BUN SERPL-MCNC: 7 MG/DL (ref 8–27)
BUN/CREAT SERPL: 12 (ref 12–28)
CALCIUM SERPL-MCNC: 9.1 MG/DL (ref 8.7–10.3)
CHLORIDE SERPL-SCNC: 96 MMOL/L (ref 96–106)
CHOLEST SERPL-MCNC: 225 MG/DL (ref 100–199)
CO2 SERPL-SCNC: 24 MMOL/L (ref 20–29)
CREAT SERPL-MCNC: 0.6 MG/DL (ref 0.57–1)
EGFR: 98 ML/MIN/1.73
GLOBULIN SER CALC-MCNC: 2.4 G/DL (ref 1.5–4.5)
GLUCOSE SERPL-MCNC: 84 MG/DL (ref 65–99)
HDLC SERPL-MCNC: 85 MG/DL
IMP & REVIEW OF LAB RESULTS: NORMAL
LDLC SERPL CALC-MCNC: 124 MG/DL (ref 0–99)
POTASSIUM SERPL-SCNC: 4.2 MMOL/L (ref 3.5–5.2)
PROT SERPL-MCNC: 7.1 G/DL (ref 6–8.5)
SODIUM SERPL-SCNC: 134 MMOL/L (ref 134–144)
TRIGL SERPL-MCNC: 90 MG/DL (ref 0–149)
TSH SERPL DL<=0.005 MIU/L-ACNC: 2.76 UIU/ML (ref 0.45–4.5)
VIT B12 SERPL-MCNC: 671 PG/ML (ref 232–1245)
VLDLC SERPL CALC-MCNC: 16 MG/DL (ref 5–40)

## 2022-09-22 ENCOUNTER — HOSPITAL ENCOUNTER (OUTPATIENT)
Dept: BONE DENSITY | Age: 67
Discharge: HOME | End: 2022-09-22
Attending: INTERNAL MEDICINE
Payer: MEDICARE

## 2022-09-22 ENCOUNTER — HOSPITAL ENCOUNTER (OUTPATIENT)
Dept: WOMENS IMAGING | Age: 67
Discharge: HOME | End: 2022-09-22
Attending: INTERNAL MEDICINE
Payer: MEDICARE

## 2022-09-22 DIAGNOSIS — Z12.31 VISIT FOR SCREENING MAMMOGRAM: ICD-10-CM

## 2022-09-22 DIAGNOSIS — Z12.31 ENCOUNTER FOR SCREENING MAMMOGRAM FOR MALIGNANT NEOPLASM OF BREAST: ICD-10-CM

## 2022-09-22 DIAGNOSIS — Z78.0 POSTMENOPAUSAL STATE: ICD-10-CM

## 2022-09-22 DIAGNOSIS — M81.0 OSTEOPOROSIS WITHOUT CURRENT PATHOLOGICAL FRACTURE, UNSPECIFIED OSTEOPOROSIS TYPE: ICD-10-CM

## 2022-09-22 PROCEDURE — 77063 BREAST TOMOSYNTHESIS BI: CPT

## 2022-09-22 PROCEDURE — 77080 DXA BONE DENSITY AXIAL: CPT

## 2022-10-12 NOTE — PROGRESS NOTES
Cholesterol and Vit d are improved    ASCVD risk score is 5%-no need for statin    The 10-year ASCVD risk score (Antonio DIALLO, et al., 2019) is: 5%    Values used to calculate the score:      Age: 79 years      Sex: Female      Is Non- : No      Diabetic: No      Tobacco smoker: No      Systolic Blood Pressure: 674 mmHg      Is BP treated: No      HDL Cholesterol: 85 mg/dL      Total Cholesterol: 225 mg/dL

## 2022-10-17 DIAGNOSIS — E55.9 VITAMIN D DEFICIENCY: ICD-10-CM

## 2022-10-17 RX ORDER — ERGOCALCIFEROL 1.25 MG/1
50000 CAPSULE ORAL
Qty: 12 CAPSULE | Refills: 1 | Status: SHIPPED | OUTPATIENT
Start: 2022-10-17

## 2023-03-15 ENCOUNTER — OFFICE VISIT (OUTPATIENT)
Facility: CLINIC | Age: 68
End: 2023-03-15

## 2023-03-15 VITALS
BODY MASS INDEX: 19.52 KG/M2 | SYSTOLIC BLOOD PRESSURE: 122 MMHG | HEIGHT: 58 IN | HEART RATE: 60 BPM | DIASTOLIC BLOOD PRESSURE: 62 MMHG | OXYGEN SATURATION: 96 % | RESPIRATION RATE: 14 BRPM | WEIGHT: 93 LBS | TEMPERATURE: 98.4 F

## 2023-03-15 DIAGNOSIS — M81.0 AGE-RELATED OSTEOPOROSIS WITHOUT CURRENT PATHOLOGICAL FRACTURE: ICD-10-CM

## 2023-03-15 DIAGNOSIS — Z00.00 MEDICARE ANNUAL WELLNESS VISIT, SUBSEQUENT: Primary | ICD-10-CM

## 2023-03-15 DIAGNOSIS — R87.610 ATYPICAL SQUAMOUS CELLS OF UNDETERMINED SIGNIFICANCE ON CYTOLOGIC SMEAR OF CERVIX (ASC-US): ICD-10-CM

## 2023-03-15 DIAGNOSIS — E78.5 HYPERLIPIDEMIA, UNSPECIFIED HYPERLIPIDEMIA TYPE: ICD-10-CM

## 2023-03-15 DIAGNOSIS — R00.2 HEART PALPITATIONS: ICD-10-CM

## 2023-03-15 RX ORDER — ASCORBIC ACID 250 MG
TABLET ORAL
COMMUNITY

## 2023-03-15 RX ORDER — ACETAMINOPHEN 325 MG/1
TABLET ORAL EVERY 4 HOURS PRN
COMMUNITY
Start: 2020-02-08

## 2023-03-15 RX ORDER — CYCLOSPORINE 0.5 MG/ML
EMULSION OPHTHALMIC
COMMUNITY
Start: 2023-03-03

## 2023-03-15 ASSESSMENT — LIFESTYLE VARIABLES
HOW MANY STANDARD DRINKS CONTAINING ALCOHOL DO YOU HAVE ON A TYPICAL DAY: PATIENT DOES NOT DRINK
HOW OFTEN DO YOU HAVE A DRINK CONTAINING ALCOHOL: NEVER

## 2023-03-15 ASSESSMENT — PATIENT HEALTH QUESTIONNAIRE - PHQ9
SUM OF ALL RESPONSES TO PHQ QUESTIONS 1-9: 2
SUM OF ALL RESPONSES TO PHQ QUESTIONS 1-9: 2
1. LITTLE INTEREST OR PLEASURE IN DOING THINGS: 1
SUM OF ALL RESPONSES TO PHQ9 QUESTIONS 1 & 2: 2
SUM OF ALL RESPONSES TO PHQ QUESTIONS 1-9: 2
SUM OF ALL RESPONSES TO PHQ QUESTIONS 1-9: 2
2. FEELING DOWN, DEPRESSED OR HOPELESS: 1

## 2023-03-15 NOTE — PROGRESS NOTES
Medicare Annual Wellness Visit    Manuel Conway is here for Medicare AWV and Follow-up Chronic Condition    Assessment & Plan   Medicare annual wellness visit, subsequent-scheduled for colonoscopy already  -     [facility] -  Referral to ACP Clinical Specialist  Heart palpitations-need to rule out arrythmia  -     Cardiac event monitor; Future  Hyperlipidemia, unspecified hyperlipidemia type-low cholesterol diet advised  and will determine ASCVD risk with next labs to see if she will benefit from taking a statin   Age-related osteoporosis without current pathological fracture-declines meds, continue with daily Ca+d, walk for exercise      Recommendations for Preventive Services Due: see orders and patient instructions/AVS.  Recommended screening schedule for the next 5-10 years is provided to the patient in written form: see Patient Instructions/AVS.     Return in about 6 months (around 9/15/2023) for follow up.      Subjective       Colonoscopy in May with Dr Cheng Perea, sister dx with rectal cancer recently    Interested in talking to our ACP team    Wants mammo done Q 2 yrs    Has not been to gyn for a while  2016-had neg colpo    2020-had surgery for extrammamamry Paget's disease      Osteoporosis-lumbar spine-declined inj, Fosamax bec of possible side effects  Mom lived to be 80 and had osteoporosis    Lab Results   Component Value Date/Time    CHOL 225 09/15/2022 12:29 PM    TRIG 90 09/15/2022 12:29 PM    HDL 85 09/15/2022 12:29 PM    [address] 124 09/15/2022 12:29 PM        The 10-year ASCVD risk score (Perri STOVALL, et al., 2019) is: 5.7%    Values used to calculate the score:      Age: 79 years      Sex: Female      Is Non- : No      Diabetic: No      Tobacco smoker: No      Systolic Blood Pressure: 589 mmHg      Is BP treated: No      HDL Cholesterol: 85 mg/dL      Total Cholesterol: 225 mg/dL     Heart beats fast every so often-monitor for 30 days; resolves spontaneously after 2 min        Patient's complete Health Risk Assessment and screening values have been reviewed and are found in Flowsheets. The following problems were reviewed today and where indicated follow up appointments were made and/or referrals ordered. Positive Risk Factor Screenings with Interventions:                   Hearing Screen:  Do you or your family notice any trouble with your hearing that hasn't been managed with hearing aids?: (!) Yes    Interventions:  Patient declines any further evaluation or treatment       Advanced Directives:  Do you have a Living Will?: (!) No    Intervention:  has NO advanced directive  - referred to ACP Coordinator                                Objective   Vitals:    03/15/23 1116   BP: 122/62   Pulse: 60   Resp: 14   Temp: 98.4 °F (36.9 °C)   TempSrc: Temporal   SpO2: 96%   Weight: 93 lb (42.2 kg)   Height: 4' 10\" (1.473 m)      Body mass index is 19.44 kg/m². Exam:   Appearance: alert, well appearing,  oriented to person, place, and time, acyanotic, in no respiratory distress and well hydrated. HEENT:  NC/AT, pink conj, anicteric sclerae  Neck:  No cervical lymphadenopathy, no JVD, no thyromegaly, no carotid bruit  Heart:  RRR without M/R/G  Lungs:  CTAB, no rhonchi, rales, or wheezes with good air exchange   Abdomen:  Non-tender, pos bowel sounds, no hepatosplenomegaly  Ext:  No C/C/E    Skin: no rash  Neuro: no lateralizing signs, CNs II-XII intact             No Known Allergies  Prior to Visit Medications    Medication Sig Taking?  Authorizing Provider   ascorbic acid (VITAMIN C) 250 MG tablet Take by mouth Yes Historical Provider, MD   RESTASIS 0.05 % ophthalmic emulsion instill 1 drop into both eyes twice a day Yes Historical Provider, MD   acetaminophen (TYLENOL) 325 MG tablet Take by mouth every 4 hours as needed Yes Historical Provider, MD   White Petrolatum-Mineral Oil ([place] Gauze OP) Apply to eye Yes Historical Provider, MD   ergocalciferol (ERGOCALCIFEROL) 1.25 MG (64824 UT) capsule Take 50,000 Units by mouth every 7 days Yes Ar Automatic Reconciliation       CareTeam (Including outside providers/suppliers regularly involved in providing care):   Patient Care Team:  Lele Gamboa MD as PCP - General  Lele Gamboa MD as PCP - Empaneled Provider     Reviewed and updated this visit:  Tobacco  Allergies  Meds  Problems  Med Hx  Surg Hx  Soc Hx  Fam Hx               Art Potts MD

## 2023-03-15 NOTE — PATIENT INSTRUCTIONS
Advance Directives: Care Instructions  Overview  An advance directive is a legal way to state your wishes at the end of your life. It tells your family and your doctor what to do if you can't say what you want. There are two main types of advance directives. You can change them any time your wishes change. Living will. This form tells your family and your doctor your wishes about life support and other treatment. The form is also called a declaration. Medical power of . This form lets you name a person to make treatment decisions for you when you can't speak for yourself. This person is called a health care agent (health care proxy, health care surrogate). The form is also called a durable power of  for health care. If you do not have an advance directive, decisions about your medical care may be made by a family member, or by a doctor or a  who doesn't know you. It may help to think of an advance directive as a gift to the people who care for you. If you have one, they won't have to make tough decisions by themselves. For more information, including forms for your state, see the [address] website (www.caringinfo.org/planning/advance-directives/). Follow-up care is a key part of your treatment and safety. Be sure to make and go to all appointments, and call your doctor if you are having problems. It's also a good idea to know your test results and keep a list of the medicines you take. What should you include in an advance directive? Many states have a unique advance directive form. (It may ask you to address specific issues.) Or you might use a universal form that's approved by many states. If your form doesn't tell you what to address, it may be hard to know what to include in your advance directive. Use the questions below to help you get started. Who do you want to make decisions about your medical care if you are not able to?   What life-support measures do you want if you have a serious illness that gets worse over time or can't be cured? What are you most afraid of that might happen? (Maybe you're afraid of having pain, losing your independence, or being kept alive by machines.)  Where would you prefer to die? (Your home? A hospital? A nursing home?)  Do you want to donate your organs when you die? Do you want certain Nondenominational practices performed before you die? When should you call for help? Be sure to contact your doctor if you have any questions. Where can you learn more? Go to http://www.armas.com/ and enter R264 to learn more about \"Advance Directives: Care Instructions. \"  Current as of: June 16, 2022               Content Version: 13.5  © 0076-8082 [employer]. Care instructions adapted under license by [employer]. If you have questions about a medical condition or this instruction, always ask your healthcare professional. [place] [address] any warranty or liability for your use of this information. A Healthy Heart: Care Instructions  Your Care Instructions     Coronary artery disease, also called heart disease, occurs when a substance called plaque builds up in the vessels that supply oxygen-rich blood to your heart muscle. This can narrow the blood vessels and reduce blood flow. A heart attack happens when blood flow is completely blocked. A high-fat diet, smoking, and other factors increase the risk of heart disease. Your doctor has found that you have a chance of having heart disease. You can do lots of things to keep your heart healthy. It may not be easy, but you can change your diet, exercise more, and quit smoking. These steps really work to lower your chance of heart disease. Follow-up care is a key part of your treatment and safety. Be sure to make and go to all appointments, and call your doctor if you are having problems.  It's also a good idea to know your test results and keep a list of the medicines you take. How can you care for yourself at home? Diet    Use less salt when you cook and eat. This helps lower your blood pressure. Taste food before salting. Add only a little salt when you think you need it. With time, your taste buds will adjust to less salt. Eat fewer snack items, fast foods, canned soups, and other high-salt, high-fat, processed foods. Read food labels and try to avoid saturated and trans fats. They increase your risk of heart disease by raising cholesterol levels. Limit the amount of solid fat-butter, margarine, and shortening-you eat. Use olive, peanut, or canola oil when you cook. Bake, broil, and steam foods instead of frying them. Eat a variety of fruit and vegetables every day. Dark green, deep orange, red, or yellow fruits and vegetables are especially good for you. Examples include spinach, carrots, peaches, and berries. Foods high in fiber can reduce your cholesterol and provide important vitamins and minerals. High-fiber foods include whole-grain cereals and breads, oatmeal, beans, brown rice, citrus fruits, and apples. Eat lean proteins. Heart-healthy proteins include seafood, lean meats and poultry, eggs, beans, peas, nuts, seeds, and soy products. Limit drinks and foods with added sugar. These include candy, desserts, and soda pop. Lifestyle changes    If your doctor recommends it, get more exercise. Walking is a good choice. Bit by bit, increase the amount you walk every day. Try for at least 30 minutes on most days of the week. You also may want to swim, bike, or do other activities. Do not smoke. If you need help quitting, talk to your doctor about stop-smoking programs and medicines. These can increase your chances of quitting for good. Quitting smoking may be the most important step you can take to protect your heart. It is never too late to quit. Limit alcohol to 2 drinks a day for men and 1 drink a day for women.  Too much alcohol can cause health problems. Manage other health problems such as diabetes, high blood pressure, and high cholesterol. If you think you may have a problem with alcohol or drug use, talk to your doctor. Medicines    Take your medicines exactly as prescribed. Call your doctor if you think you are having a problem with your medicine. If your doctor recommends aspirin, take the amount directed each day. Make sure you take aspirin and not another kind of pain reliever, such as acetaminophen (Tylenol). When should you call for help? Call 911 if you have symptoms of a heart attack. These may include:    Chest pain or pressure, or a strange feeling in the chest.     Sweating. Shortness of breath. Pain, pressure, or a strange feeling in the back, neck, jaw, or upper belly or in one or both shoulders or arms. Lightheadedness or sudden weakness. A fast or irregular heartbeat. After you call 911, the  may tell you to chew 1 adult-strength or 2 to 4 low-dose aspirin. Wait for an ambulance. Do not try to drive yourself. Watch closely for changes in your health, and be sure to contact your doctor if you have any problems. Where can you learn more? Go to http://www.armas.com/ and enter F075 to learn more about \"A Healthy Heart: Care Instructions. \"  Current as of: September 7, 2022               Content Version: 13.5  © 4517-4865 [employer]. Care instructions adapted under license by [facility] If you have questions about a medical condition or this instruction, always ask your healthcare professional. [place] [address] any warranty or liability for your use of this information. Personalized Preventive Plan for Nicolas Carrera - 3/15/2023  Medicare offers a range of preventive health benefits. Some of the tests and screenings are paid in full while other may be subject to a deductible, co-insurance, and/or copay.     Some of these benefits include a comprehensive review of your medical history including lifestyle, illnesses that may run in your family, and various assessments and screenings as appropriate. After reviewing your medical record and screening and assessments performed today your provider may have ordered immunizations, labs, imaging, and/or referrals for you. A list of these orders (if applicable) as well as your Preventive Care list are included within your After Visit Summary for your review. Other Preventive Recommendations:    A preventive eye exam performed by an eye specialist is recommended every 1-2 years to screen for glaucoma; cataracts, macular degeneration, and other eye disorders. A preventive dental visit is recommended every 6 months. Try to get at least 150 minutes of exercise per week or 10,000 steps per day on a pedometer . Order or download the FREE \"Exercise & Physical Activity: Your Everyday Guide\" from [employer]. Call 7-618.372.1011 or search The [employer] online. You need 1859-4348 mg of calcium and 2519-0163 IU of vitamin D per day. It is possible to meet your calcium requirement with diet alone, but a vitamin D supplement is usually necessary to meet this goal.  When exposed to the sun, use a sunscreen that protects against both UVA and UVB radiation with an SPF of 30 or greater. Reapply every 2 to 3 hours or after sweating, drying off with a towel, or swimming. Always wear a seat belt when traveling in a car. Always wear a helmet when riding a bicycle or motorcycle. DASH Diet: Care Instructions  Your Care Instructions     The DASH diet is an eating plan that can help lower your blood pressure. DASH stands for Dietary Approaches to Stop Hypertension. Hypertension is high blood pressure. The DASH diet focuses on eating foods that are high in calcium, potassium, and magnesium. These nutrients can lower blood pressure.  The foods that are highest in these nutrients are fruits, vegetables, low-fat dairy products, nuts, seeds, and legumes. But taking calcium, potassium, and magnesium supplements instead of eating foods that are high in those nutrients does not have the same effect. The DASH diet also includes whole grains, fish, and poultry. The DASH diet is one of several lifestyle changes your doctor may recommend to lower your high blood pressure. Your doctor may also want you to decrease the amount of sodium in your diet. Lowering sodium while following the DASH diet can lower blood pressure even further than just the DASH diet alone. Follow-up care is a key part of your treatment and safety. Be sure to make and go to all appointments, and call your doctor if you are having problems. It's also a good idea to know your test results and keep a list of the medicines you take. How can you care for yourself at home? Following the DASH diet  Eat 4 to 5 servings of fruit each day. A serving is 1 medium-sized piece of fruit, ½ cup chopped or canned fruit, 1/4 cup dried fruit, or 4 ounces (½ cup) of fruit juice. Choose fruit more often than fruit juice. Eat 4 to 5 servings of vegetables each day. A serving is 1 cup of lettuce or raw leafy vegetables, ½ cup of chopped or cooked vegetables, or 4 ounces (½ cup) of vegetable juice. Choose vegetables more often than vegetable juice. Get 2 to 3 servings of low-fat and fat-free dairy each day. A serving is 8 ounces of milk, 1 cup of yogurt, or 1 ½ ounces of cheese. Eat 6 to 8 servings of grains each day. A serving is 1 slice of bread, 1 ounce of dry cereal, or ½ cup of cooked rice, pasta, or cooked cereal. Try to choose whole-grain products as much as possible. Limit lean meat, poultry, and fish to 2 servings each day. A serving is 3 ounces, about the size of a deck of cards. Eat 4 to 5 servings of nuts, seeds, and legumes (cooked dried beans, lentils, and split peas) each week.  A serving is 1/3 cup of nuts, 2 tablespoons of seeds, or ½ cup of cooked beans or peas. Limit fats and oils to 2 to 3 servings each day. A serving is 1 teaspoon of vegetable oil or 2 tablespoons of salad dressing. Limit sweets and added sugars to 5 servings or less a week. A serving is 1 tablespoon jelly or jam, ½ cup sorbet, or 1 cup of lemonade. Eat less than 2,300 milligrams (mg) of sodium a day. If you limit your sodium to 1,500 mg a day, you can lower your blood pressure even more. Be aware that all of these are the suggested number of servings for people who eat 1,800 to 2,000 calories a day. Your recommended number of servings may be different if you need more or fewer calories. Tips for success  Start small. Do not try to make dramatic changes to your diet all at once. You might feel that you are missing out on your favorite foods and then be more likely to not follow the plan. Make small changes, and stick with them. Once those changes become habit, add a few more changes. Try some of the following:  Make it a goal to eat a fruit or vegetable at every meal and at snacks. This will make it easy to get the recommended amount of fruits and vegetables each day. Try yogurt topped with fruit and nuts for a snack or healthy dessert. Add lettuce, tomato, cucumber, and onion to sandwiches. Combine a ready-made pizza crust with low-fat mozzarella cheese and lots of vegetable toppings. Try using tomatoes, squash, spinach, broccoli, carrots, cauliflower, and onions. Have a variety of cut-up vegetables with a low-fat dip as an appetizer instead of chips and dip. Sprinkle sunflower seeds or chopped almonds over salads. Or try adding chopped walnuts or almonds to cooked vegetables. Try some vegetarian meals using beans and peas. Add garbanzo or kidney beans to salads. Make burritos and tacos with mashed cabral beans or black beans.

## 2023-03-15 NOTE — PROGRESS NOTES
1. \"Have you been to the ER, urgent care clinic since your last visit? Hospitalized since your last visit? \" No    2. \"Have you seen or consulted any other health care providers outside of the [address] [place] [address] since your last visit? \" Yes Follow up for eye dryness with Ophthalmology    3. For patients aged 39-70: Has the patient had a colonoscopy / FIT/ Cologuard? Yes Care Gap Present. Most recent result on file. If the patient is female:    4. For patients aged 41-77: Has the patient had a mammogram within the past 2 years? Yes - no Care Gap present    5. For patients aged 21-65: Has the patient had a pap smear?  NA - based on age or sex    Health Maintenance Due   Topic Date Due    Colorectal Cancer Screen  07/30/2020    Annual Wellness Visit (AWV)  01/20/2023

## 2023-03-16 ENCOUNTER — CLINICAL DOCUMENTATION (OUTPATIENT)
Dept: SPIRITUAL SERVICES | Age: 68
End: 2023-03-16

## 2023-03-16 NOTE — ACP (ADVANCE CARE PLANNING)
Advance Care Planning   Ambulatory ACP Specialist Patient Outreach    Date:  3/16/2023  ACP Specialist:  [address] call to patient in follow-up to ACP Specialist referral from: Malgorzata Greene MD    [x] PCP  [] Provider   [] Ambulatory Care Management [] Other for Reason:    [x] Advance Directive Assistance  [] Code Status Discussion  [] Complete Portable DNR Order  [] Discuss Goals of Care  [] Complete POST/MOST  [] Early ACP Decision-Making  [] Other    Date Referral Received:3/15/23    Today's Outreach:  [x] First   [] Second  [] Third                               Third outreach made by []  phone  [] email []   [employer]     Intervention:  [x] Spoke with Patient  [] Left VM requesting return call      Outcome:    Spoke with patient scheduling a telephone conversation with ACP Specialist Chelo Isabel for Monday, 3/27/23 at 1:00 p.m. Patient requested to have ACP information mailed to her home address. Next Step:   [x] ACP scheduled conversation  [] Outreach again in one week               [] Email / Mail ACP Info Sheets  [] Email / Mail Advance Directive            [] Close Referral. Routing closure to referring provider/staff and to ACP Specialist . [] Closure Letter mailed to Patient with Invitation to Contact ACP Specialist if/when ready.     Thank you for this referral.

## 2023-03-27 ENCOUNTER — CLINICAL DOCUMENTATION (OUTPATIENT)
Dept: CASE MANAGEMENT | Age: 68
End: 2023-03-27

## 2023-03-27 NOTE — ACP (ADVANCE CARE PLANNING)
Advance Care Planning   Ambulatory ACP Specialist Patient Outreach    Date:  3/27/2023  ACP Specialist:  Heather Trevino LCSW    Outreach call to patient in follow-up to ACP Specialist referral from: Nancy White MD    [x] PCP  [] Provider   [] Ambulatory Care Management [] Other for Reason:    [x] Advance Directive Assistance  [] Code Status Discussion  [] Complete Portable DNR Order  [] Discuss Goals of Care  [] Complete POST/MOST  [] Early ACP Decision-Making  [] Other    Date Referral Received: 03/15/2023    Today's Outreach:  [] First   [x] Second  [] Third                               Third outreach made by []  phone  [] email []   Thanxhart     Intervention:  [] Spoke with Patient  [x] Left VM requesting return call      Outcome:  Phone call made to patient for scheduled Advance Care Planning appointment. Pt did not answer and voice message left asking patient to call back to if she has a desire to reschedule this appointment. ACP Specialist will call patient again in 1 week to see if she has a desire to reschedule this ACP appointment if she has not called back prior to this time. 3/27/2023 2:50pm- Pt called back requesting a rescheduled ACP appointment. New appointment scheduled for April 5, 2023 at 3:00pm.     Next Step:   [] ACP scheduled conversation  [x] Outreach again in one week               [] Email / Mail [address]  [] Email / Mail Advance Directive            [] Close Referral. Routing closure to referring provider/staff and to ACP Specialist . [] Closure Letter mailed to Patient with Invitation to Contact ACP Specialist if/when ready. Thank you for this referral.      Omar Ramsay.  EBONIE Kay  Advance Care   [facility]  [phone]

## 2023-04-05 ENCOUNTER — CLINICAL DOCUMENTATION (OUTPATIENT)
Dept: CASE MANAGEMENT | Age: 68
End: 2023-04-05

## 2023-04-05 NOTE — ACP (ADVANCE CARE PLANNING)
resuscitate) or would you prefer a natural death (answer \"no\" for do not attempt to resuscitate)? \" Pt not ready to express her medical wishes at this time. [x] Yes   [] No   Educated Patient / Adama Jacques regarding differences between Advance Directives and portable DNR orders. Length of ACP Conversation in minutes:  39    Conversation Outcomes:  ACP discussion completed and New Advance Directive completed (reviewed [facility], patient may complete on her own or may call back for more assistance from this ACP Specialist     Follow-up plan:    [] Schedule follow-up conversation to continue planning  [] Referred individual to Provider for additional questions/concerns   [x] Advised patient/agent/surrogate to review completed ACP document and update if needed with changes in condition, patient preferences or care setting    [x] This note routed to one or more involved healthcare providers    ACP Specialist met with patient for scheduled Advance Care Planning appointment. Full ACP discussion completed, reviewed information about ACP, reviewed [facility] and answered all questions regarding the VA AMD, medical procedures to include, What is CPR, What is a Ventilator and also discussed the difference between an AMD and a DNR order as well as how to choose a [place] and the [place] of patient's identified HCDM. Full VA AMD reviewed and patient has opted to think about her health care decisions more before making decision to complete VA AMD on her own versus obtaining another appointment with this ACP Specialist to have assistance with completing the VA AMD.     Plan: Pt wants to review educational material, VA AMD and think about her health care wishes before proceeding with completion of the actual VA AMD.  Pt states she will either complete document on her own or call back for another appointment.   Referral will be closed

## 2023-07-03 RX ORDER — ERGOCALCIFEROL 1.25 MG/1
50000 CAPSULE ORAL
Qty: 12 CAPSULE | Refills: 1 | Status: SHIPPED | OUTPATIENT
Start: 2023-07-03

## 2023-09-18 ENCOUNTER — OFFICE VISIT (OUTPATIENT)
Facility: CLINIC | Age: 68
End: 2023-09-18
Payer: MEDICARE

## 2023-09-18 VITALS
HEART RATE: 55 BPM | HEIGHT: 58 IN | SYSTOLIC BLOOD PRESSURE: 132 MMHG | RESPIRATION RATE: 18 BRPM | OXYGEN SATURATION: 99 % | TEMPERATURE: 98.2 F | WEIGHT: 91.8 LBS | DIASTOLIC BLOOD PRESSURE: 70 MMHG | BODY MASS INDEX: 19.27 KG/M2

## 2023-09-18 DIAGNOSIS — M81.0 AGE-RELATED OSTEOPOROSIS WITHOUT CURRENT PATHOLOGICAL FRACTURE: ICD-10-CM

## 2023-09-18 DIAGNOSIS — E55.9 VITAMIN D DEFICIENCY, UNSPECIFIED: ICD-10-CM

## 2023-09-18 DIAGNOSIS — R00.2 HEART PALPITATIONS: ICD-10-CM

## 2023-09-18 DIAGNOSIS — E78.5 HYPERLIPIDEMIA, UNSPECIFIED HYPERLIPIDEMIA TYPE: Primary | ICD-10-CM

## 2023-09-18 PROCEDURE — 1090F PRES/ABSN URINE INCON ASSESS: CPT | Performed by: INTERNAL MEDICINE

## 2023-09-18 PROCEDURE — G8420 CALC BMI NORM PARAMETERS: HCPCS | Performed by: INTERNAL MEDICINE

## 2023-09-18 PROCEDURE — G0008 ADMIN INFLUENZA VIRUS VAC: HCPCS | Performed by: INTERNAL MEDICINE

## 2023-09-18 PROCEDURE — 1036F TOBACCO NON-USER: CPT | Performed by: INTERNAL MEDICINE

## 2023-09-18 PROCEDURE — 1123F ACP DISCUSS/DSCN MKR DOCD: CPT | Performed by: INTERNAL MEDICINE

## 2023-09-18 PROCEDURE — G8399 PT W/DXA RESULTS DOCUMENT: HCPCS | Performed by: INTERNAL MEDICINE

## 2023-09-18 PROCEDURE — 99214 OFFICE O/P EST MOD 30 MIN: CPT | Performed by: INTERNAL MEDICINE

## 2023-09-18 PROCEDURE — 3017F COLORECTAL CA SCREEN DOC REV: CPT | Performed by: INTERNAL MEDICINE

## 2023-09-18 PROCEDURE — G8427 DOCREV CUR MEDS BY ELIG CLIN: HCPCS | Performed by: INTERNAL MEDICINE

## 2023-09-18 PROCEDURE — 90662 IIV NO PRSV INCREASED AG IM: CPT | Performed by: INTERNAL MEDICINE

## 2023-09-18 SDOH — ECONOMIC STABILITY: INCOME INSECURITY: HOW HARD IS IT FOR YOU TO PAY FOR THE VERY BASICS LIKE FOOD, HOUSING, MEDICAL CARE, AND HEATING?: NOT HARD AT ALL

## 2023-09-18 SDOH — ECONOMIC STABILITY: HOUSING INSECURITY
IN THE LAST 12 MONTHS, WAS THERE A TIME WHEN YOU DID NOT HAVE A STEADY PLACE TO SLEEP OR SLEPT IN A SHELTER (INCLUDING NOW)?: NO

## 2023-09-18 SDOH — ECONOMIC STABILITY: FOOD INSECURITY: WITHIN THE PAST 12 MONTHS, YOU WORRIED THAT YOUR FOOD WOULD RUN OUT BEFORE YOU GOT MONEY TO BUY MORE.: NEVER TRUE

## 2023-09-18 SDOH — ECONOMIC STABILITY: FOOD INSECURITY: WITHIN THE PAST 12 MONTHS, THE FOOD YOU BOUGHT JUST DIDN'T LAST AND YOU DIDN'T HAVE MONEY TO GET MORE.: NEVER TRUE

## 2023-09-18 ASSESSMENT — PATIENT HEALTH QUESTIONNAIRE - PHQ9
SUM OF ALL RESPONSES TO PHQ QUESTIONS 1-9: 0
2. FEELING DOWN, DEPRESSED OR HOPELESS: 0
SUM OF ALL RESPONSES TO PHQ QUESTIONS 1-9: 0
SUM OF ALL RESPONSES TO PHQ9 QUESTIONS 1 & 2: 0
1. LITTLE INTEREST OR PLEASURE IN DOING THINGS: 0

## 2023-09-19 LAB
25(OH)D3+25(OH)D2 SERPL-MCNC: 34.4 NG/ML (ref 30–100)
ALBUMIN SERPL-MCNC: 4.7 G/DL (ref 3.9–4.9)
ALBUMIN/GLOB SERPL: 1.8 {RATIO} (ref 1.2–2.2)
ALP SERPL-CCNC: 63 IU/L (ref 44–121)
ALT SERPL-CCNC: 10 IU/L (ref 0–32)
AST SERPL-CCNC: 19 IU/L (ref 0–40)
BASOPHILS # BLD AUTO: 0 X10E3/UL (ref 0–0.2)
BASOPHILS NFR BLD AUTO: 1 %
BILIRUB SERPL-MCNC: 0.5 MG/DL (ref 0–1.2)
BUN SERPL-MCNC: 8 MG/DL (ref 8–27)
BUN/CREAT SERPL: 12 (ref 12–28)
CALCIUM SERPL-MCNC: 9.5 MG/DL (ref 8.7–10.3)
CHLORIDE SERPL-SCNC: 99 MMOL/L (ref 96–106)
CHOLEST SERPL-MCNC: 249 MG/DL (ref 100–199)
CO2 SERPL-SCNC: 26 MMOL/L (ref 20–29)
CREAT SERPL-MCNC: 0.68 MG/DL (ref 0.57–1)
EGFRCR SERPLBLD CKD-EPI 2021: 95 ML/MIN/1.73
EOSINOPHIL # BLD AUTO: 0 X10E3/UL (ref 0–0.4)
EOSINOPHIL NFR BLD AUTO: 1 %
ERYTHROCYTE [DISTWIDTH] IN BLOOD BY AUTOMATED COUNT: 12.9 % (ref 11.7–15.4)
GLOBULIN SER CALC-MCNC: 2.6 G/DL (ref 1.5–4.5)
GLUCOSE SERPL-MCNC: 80 MG/DL (ref 70–99)
HCT VFR BLD AUTO: 39.3 % (ref 34–46.6)
HDLC SERPL-MCNC: 85 MG/DL
HGB BLD-MCNC: 12.9 G/DL (ref 11.1–15.9)
IMM GRANULOCYTES # BLD AUTO: 0 X10E3/UL (ref 0–0.1)
IMM GRANULOCYTES NFR BLD AUTO: 0 %
LDLC SERPL CALC-MCNC: 146 MG/DL (ref 0–99)
LYMPHOCYTES # BLD AUTO: 1.4 X10E3/UL (ref 0.7–3.1)
LYMPHOCYTES NFR BLD AUTO: 28 %
MCH RBC QN AUTO: 29.1 PG (ref 26.6–33)
MCHC RBC AUTO-ENTMCNC: 32.8 G/DL (ref 31.5–35.7)
MCV RBC AUTO: 89 FL (ref 79–97)
MONOCYTES # BLD AUTO: 0.4 X10E3/UL (ref 0.1–0.9)
MONOCYTES NFR BLD AUTO: 7 %
NEUTROPHILS # BLD AUTO: 3.3 X10E3/UL (ref 1.4–7)
NEUTROPHILS NFR BLD AUTO: 63 %
PLATELET # BLD AUTO: 222 X10E3/UL (ref 150–450)
POTASSIUM SERPL-SCNC: 4.4 MMOL/L (ref 3.5–5.2)
PROT SERPL-MCNC: 7.3 G/DL (ref 6–8.5)
RBC # BLD AUTO: 4.44 X10E6/UL (ref 3.77–5.28)
SODIUM SERPL-SCNC: 138 MMOL/L (ref 134–144)
SPECIMEN STATUS REPORT: NORMAL
TRIGL SERPL-MCNC: 104 MG/DL (ref 0–149)
TSH SERPL DL<=0.005 MIU/L-ACNC: 2.11 UIU/ML (ref 0.45–4.5)
VLDLC SERPL CALC-MCNC: 18 MG/DL (ref 5–40)
WBC # BLD AUTO: 5.2 X10E3/UL (ref 3.4–10.8)

## 2024-03-18 ENCOUNTER — OFFICE VISIT (OUTPATIENT)
Facility: CLINIC | Age: 69
End: 2024-03-18
Payer: MEDICARE

## 2024-03-18 VITALS
TEMPERATURE: 98.4 F | DIASTOLIC BLOOD PRESSURE: 78 MMHG | WEIGHT: 92.2 LBS | HEIGHT: 58 IN | RESPIRATION RATE: 16 BRPM | OXYGEN SATURATION: 97 % | BODY MASS INDEX: 19.35 KG/M2 | HEART RATE: 64 BPM | SYSTOLIC BLOOD PRESSURE: 135 MMHG

## 2024-03-18 DIAGNOSIS — C44.99 EXTRAMAMMARY PAGET'S DISEASE: ICD-10-CM

## 2024-03-18 DIAGNOSIS — D12.6 TUBULAR ADENOMA OF COLON: ICD-10-CM

## 2024-03-18 DIAGNOSIS — M81.0 AGE-RELATED OSTEOPOROSIS WITHOUT CURRENT PATHOLOGICAL FRACTURE: ICD-10-CM

## 2024-03-18 DIAGNOSIS — E55.9 VITAMIN D DEFICIENCY, UNSPECIFIED: ICD-10-CM

## 2024-03-18 DIAGNOSIS — R87.610 ATYPICAL SQUAMOUS CELLS OF UNDETERMINED SIGNIFICANCE ON CYTOLOGIC SMEAR OF CERVIX (ASC-US): ICD-10-CM

## 2024-03-18 DIAGNOSIS — Z00.00 MEDICARE ANNUAL WELLNESS VISIT, SUBSEQUENT: Primary | ICD-10-CM

## 2024-03-18 DIAGNOSIS — E78.5 HYPERLIPIDEMIA, UNSPECIFIED HYPERLIPIDEMIA TYPE: ICD-10-CM

## 2024-03-18 PROCEDURE — G0439 PPPS, SUBSEQ VISIT: HCPCS | Performed by: INTERNAL MEDICINE

## 2024-03-18 PROCEDURE — 1123F ACP DISCUSS/DSCN MKR DOCD: CPT | Performed by: INTERNAL MEDICINE

## 2024-03-18 PROCEDURE — 99214 OFFICE O/P EST MOD 30 MIN: CPT | Performed by: INTERNAL MEDICINE

## 2024-03-18 ASSESSMENT — PATIENT HEALTH QUESTIONNAIRE - PHQ9
SUM OF ALL RESPONSES TO PHQ QUESTIONS 1-9: 0
2. FEELING DOWN, DEPRESSED OR HOPELESS: NOT AT ALL
2. FEELING DOWN, DEPRESSED OR HOPELESS: NOT AT ALL
SUM OF ALL RESPONSES TO PHQ QUESTIONS 1-9: 0
1. LITTLE INTEREST OR PLEASURE IN DOING THINGS: NOT AT ALL
SUM OF ALL RESPONSES TO PHQ QUESTIONS 1-9: 0
SUM OF ALL RESPONSES TO PHQ9 QUESTIONS 1 & 2: 0
SUM OF ALL RESPONSES TO PHQ QUESTIONS 1-9: 0
SUM OF ALL RESPONSES TO PHQ9 QUESTIONS 1 & 2: 0
SUM OF ALL RESPONSES TO PHQ QUESTIONS 1-9: 0
1. LITTLE INTEREST OR PLEASURE IN DOING THINGS: NOT AT ALL
SUM OF ALL RESPONSES TO PHQ QUESTIONS 1-9: 0

## 2024-03-18 ASSESSMENT — LIFESTYLE VARIABLES
HOW OFTEN DO YOU HAVE A DRINK CONTAINING ALCOHOL: NEVER
HOW MANY STANDARD DRINKS CONTAINING ALCOHOL DO YOU HAVE ON A TYPICAL DAY: PATIENT DOES NOT DRINK

## 2024-03-18 NOTE — PROGRESS NOTES
\"Have you been to the ER, urgent care clinic since your last visit?  Hospitalized since your last visit?\"    NO    “Have you seen or consulted any other health care providers outside of [facility] since your last visit?”    NO            Click Here for Release of Records Request  
Assessment and screening values have been reviewed and are found in Flowsheets. The following problems were reviewed today and where indicated follow up appointments were made and/or referrals ordered.    Positive Risk Factor Screenings with Interventions:                      Advanced Directives:  Do you have a Living Will?: (!) No    Intervention:  has NO advanced directive - information provided                     Objective   Vitals:    03/18/24 1312   BP: 135/78   Pulse: 64   Resp: 16   Temp: 98.4 °F (36.9 °C)   TempSrc: Temporal   SpO2: 97%   Weight: 41.8 kg (92 lb 3.2 oz)   Height: 1.473 m (4' 10\")      Body mass index is 19.27 kg/m².               No Known Allergies  Prior to Visit Medications    Medication Sig Taking? Authorizing Provider   polyethyl glycol-propyl glycol 0.4-0.3 % (SYSTANE) 0.4-0.3 % ophthalmic solution Place 1 drop into both eyes as needed for Dry Eyes Yes Edgard Abreu MD   ergocalciferol (ERGOCALCIFEROL) 1.25 MG (01681 UT) capsule Take 1 capsule by mouth every 7 days Yes Sylwia Boyer MD   ascorbic acid (VITAMIN C) 250 MG tablet Take by mouth Yes Edgard Abreu MD   White Petrolatum-Mineral Oil (REFRESH LACRI-LUBE OP) Apply to eye Yes Edgard Abreu MD       CareTeam (Including outside providers/suppliers regularly involved in providing care):   Patient Care Team:  Sylwia Boyer MD as PCP - General  Sylwia Boyer MD as PCP - Empaneled Provider     Reviewed and updated this visit:  Tobacco  Allergies  Meds  Problems  Med Hx  Surg Hx  Soc Hx  Fam Hx

## 2024-06-03 ENCOUNTER — OFFICE VISIT (OUTPATIENT)
Facility: CLINIC | Age: 69
End: 2024-06-03
Payer: MEDICARE

## 2024-06-03 VITALS
WEIGHT: 91.2 LBS | BODY MASS INDEX: 19.14 KG/M2 | TEMPERATURE: 98.3 F | HEIGHT: 58 IN | OXYGEN SATURATION: 98 % | SYSTOLIC BLOOD PRESSURE: 133 MMHG | RESPIRATION RATE: 16 BRPM | HEART RATE: 57 BPM | DIASTOLIC BLOOD PRESSURE: 77 MMHG

## 2024-06-03 DIAGNOSIS — M54.50 RIGHT-SIDED LOW BACK PAIN WITHOUT SCIATICA, UNSPECIFIED CHRONICITY: Primary | ICD-10-CM

## 2024-06-03 DIAGNOSIS — M81.0 AGE-RELATED OSTEOPOROSIS WITHOUT CURRENT PATHOLOGICAL FRACTURE: ICD-10-CM

## 2024-06-03 PROCEDURE — 1123F ACP DISCUSS/DSCN MKR DOCD: CPT | Performed by: INTERNAL MEDICINE

## 2024-06-03 PROCEDURE — 99213 OFFICE O/P EST LOW 20 MIN: CPT | Performed by: INTERNAL MEDICINE

## 2024-06-03 RX ORDER — MELOXICAM 7.5 MG/1
7.5 TABLET ORAL DAILY PRN
Qty: 30 TABLET | Refills: 0 | Status: SHIPPED | OUTPATIENT
Start: 2024-06-03

## 2024-06-03 ASSESSMENT — PATIENT HEALTH QUESTIONNAIRE - PHQ9
2. FEELING DOWN, DEPRESSED OR HOPELESS: NOT AT ALL
SUM OF ALL RESPONSES TO PHQ QUESTIONS 1-9: 0
1. LITTLE INTEREST OR PLEASURE IN DOING THINGS: NOT AT ALL
SUM OF ALL RESPONSES TO PHQ QUESTIONS 1-9: 0
SUM OF ALL RESPONSES TO PHQ9 QUESTIONS 1 & 2: 0

## 2024-07-23 ENCOUNTER — OFFICE VISIT (OUTPATIENT)
Facility: CLINIC | Age: 69
End: 2024-07-23
Payer: MEDICARE

## 2024-07-23 VITALS
SYSTOLIC BLOOD PRESSURE: 135 MMHG | HEART RATE: 55 BPM | OXYGEN SATURATION: 97 % | HEIGHT: 58 IN | WEIGHT: 91.4 LBS | DIASTOLIC BLOOD PRESSURE: 78 MMHG | RESPIRATION RATE: 16 BRPM | TEMPERATURE: 98.3 F | BODY MASS INDEX: 19.19 KG/M2

## 2024-07-23 DIAGNOSIS — R20.2 PARESTHESIAS: ICD-10-CM

## 2024-07-23 DIAGNOSIS — M81.0 AGE-RELATED OSTEOPOROSIS WITHOUT CURRENT PATHOLOGICAL FRACTURE: ICD-10-CM

## 2024-07-23 DIAGNOSIS — M79.675 PAIN IN TOES OF BOTH FEET: Primary | ICD-10-CM

## 2024-07-23 DIAGNOSIS — M79.674 PAIN IN TOES OF BOTH FEET: Primary | ICD-10-CM

## 2024-07-23 PROCEDURE — 99214 OFFICE O/P EST MOD 30 MIN: CPT | Performed by: INTERNAL MEDICINE

## 2024-07-23 PROCEDURE — 1123F ACP DISCUSS/DSCN MKR DOCD: CPT | Performed by: INTERNAL MEDICINE

## 2024-07-23 NOTE — PROGRESS NOTES
\"Have you been to the ER, urgent care clinic since your last visit?  Hospitalized since your last visit?\"    NO    “Have you seen or consulted any other health care providers outside of [facility] since your last visit?”    NO            Click Here for Release of Records Request    
Effects and Warnings were discussed with patient: yes    Patient verbalized understanding of above instructions.    Maureen Boyer MD  Internal Medicine  [facility]

## 2024-08-23 ENCOUNTER — OFFICE VISIT (OUTPATIENT)
Facility: CLINIC | Age: 69
End: 2024-08-23

## 2024-08-23 ENCOUNTER — TELEMEDICINE (OUTPATIENT)
Facility: CLINIC | Age: 69
End: 2024-08-23

## 2024-08-23 DIAGNOSIS — J02.9 SORE THROAT: ICD-10-CM

## 2024-08-23 DIAGNOSIS — B34.9 VIRAL SYNDROME: Primary | ICD-10-CM

## 2024-08-23 DIAGNOSIS — U07.1 COVID-19: ICD-10-CM

## 2024-08-23 LAB
EXP DATE SOLUTION: NORMAL
EXP DATE SWAB: NORMAL
EXPIRATION DATE: NORMAL
GROUP A STREP ANTIGEN, POC: NEGATIVE
LOT NUMBER POC: NORMAL
LOT NUMBER SOLUTION: NORMAL
LOT NUMBER SWAB: NORMAL
QUICKVUE INFLUENZA TEST: NEGATIVE
SARS-COV-2 RNA, POC: POSITIVE
VALID INTERNAL CONTROL, POC: YES
VALID INTERNAL CONTROL, POC: YES

## 2024-08-23 RX ORDER — ASCORBIC ACID 500 MG
500 TABLET ORAL DAILY
Qty: 30 TABLET | Refills: 3 | Status: SHIPPED | OUTPATIENT
Start: 2024-08-23

## 2024-08-23 RX ORDER — SOD CHLOR,BICARB/SQUEEZ BOTTLE
PACKET, WITH RINSE DEVICE NASAL
Qty: 1 EACH | Refills: 0 | Status: SHIPPED | OUTPATIENT
Start: 2024-08-23

## 2024-08-23 RX ORDER — GUAIFENESIN 600 MG/1
600 TABLET, EXTENDED RELEASE ORAL 2 TIMES DAILY
Qty: 60 TABLET | Refills: 0 | Status: SHIPPED | OUTPATIENT
Start: 2024-08-23 | End: 2024-09-22

## 2024-08-23 ASSESSMENT — ENCOUNTER SYMPTOMS
WHEEZING: 0
SHORTNESS OF BREATH: 0
SORE THROAT: 1
COUGH: 1

## 2024-08-23 NOTE — PROGRESS NOTES
Addendum: pt COVID positive; reviewed results/management; info sent to CDC  
FRAX score)  Completed    Annual Wellness Visit (Medicare Advantage)  Completed    Shingles vaccine  Completed    Pneumococcal 65+ years Vaccine  Completed    Hepatitis C screen  Completed    Hepatitis A vaccine  Aged Out    Hepatitis B vaccine  Aged Out    Hib vaccine  Aged Out    Polio vaccine  Aged Out    Meningococcal (ACWY) vaccine  Aged Out        Subjective     70 y/o female here for sore throat; received phone call from after hours service at 0715 this morning for pt c/o sore throat so appt arranged    Pt c/o symptoms x 2 days, +subjective warmth/chills/cough/congestion and severe throat pain;  Has been using tylenol.  is also sick    PCP out of office today    Has not tested for COVID yet    Labs obtained prior to visit? No  Reviewed with patient? N/A      ROS:     Review of Systems   Constitutional:  Positive for chills, fatigue and fever.   HENT:  Positive for congestion and sore throat.    Respiratory:  Positive for cough. Negative for shortness of breath and wheezing.    Cardiovascular:  Negative for chest pain.   Musculoskeletal:  Positive for myalgias.         The problem list was updated as a part of today's visit.  Patient Active Problem List   Diagnosis    Hyperlipidemia    Vitamin D deficiency    Atypical squamous cells of undetermined significance on cytologic smear of cervix (ASC-US)    Dyspareunia, female    Osteoporosis without current pathological fracture    Extramammary Paget's disease    Tubular adenoma of colon    Age-related cataract of both eyes       The PSH, FH were reviewed.     Past Surgical History:   Procedure Laterality Date    COLONOSCOPY  2010    found polyps    COLPOSCOPY  08/2013    GI      GYN         Family History   Problem Relation Age of Onset    Hypertension Mother     Stroke Father           SH:  Social History     Tobacco Use    Smoking status: Never    Smokeless tobacco: Never   Substance Use Topics    Alcohol use: No    Drug use: No

## 2024-08-23 NOTE — PROGRESS NOTES
Patient in office today Per DR. Doss to be tested to Strep/ Flu/ COVID. All test were done and results sent to provider to be given to patient.

## 2024-09-18 ENCOUNTER — OFFICE VISIT (OUTPATIENT)
Facility: CLINIC | Age: 69
End: 2024-09-18

## 2024-09-18 DIAGNOSIS — Z23 ENCOUNTER FOR ADMINISTRATION OF VACCINE: ICD-10-CM

## 2024-09-18 DIAGNOSIS — Z00.00 MEDICARE ANNUAL WELLNESS VISIT, SUBSEQUENT: Primary | ICD-10-CM

## 2024-09-18 DIAGNOSIS — Z12.31 ENCOUNTER FOR SCREENING MAMMOGRAM FOR MALIGNANT NEOPLASM OF BREAST: ICD-10-CM

## 2024-09-18 DIAGNOSIS — E55.9 VITAMIN D DEFICIENCY, UNSPECIFIED: ICD-10-CM

## 2024-09-18 DIAGNOSIS — F43.21 GRIEF: ICD-10-CM

## 2024-09-18 DIAGNOSIS — E78.5 HYPERLIPIDEMIA, UNSPECIFIED HYPERLIPIDEMIA TYPE: ICD-10-CM

## 2024-09-18 DIAGNOSIS — M81.0 AGE-RELATED OSTEOPOROSIS WITHOUT CURRENT PATHOLOGICAL FRACTURE: ICD-10-CM

## 2024-12-09 ENCOUNTER — TELEPHONE (OUTPATIENT)
Facility: CLINIC | Age: 69
End: 2024-12-09

## 2024-12-09 NOTE — TELEPHONE ENCOUNTER
Erika Apolinar called and requested if she could get her mammogram done at [facility]. She wanted to know would she need another order. Thanks

## 2024-12-10 RX ORDER — ERGOCALCIFEROL 1.25 MG/1
50000 CAPSULE ORAL
Qty: 12 CAPSULE | Refills: 1 | Status: SHIPPED | OUTPATIENT
Start: 2024-12-10

## 2025-06-17 ENCOUNTER — TELEPHONE (OUTPATIENT)
Facility: CLINIC | Age: 70
End: 2025-06-17

## 2025-06-17 NOTE — TELEPHONE ENCOUNTER
----- Message from Osvaldo JOSE sent at 6/17/2025 10:23 AM EDT -----  Regarding: ECC Message to Provider  ECC Message to Provider    Relationship to Patient: Self     Additional Information : patient have an appointment on 8/5/2025 and want to have a blood work the same date and time of her appointment.  --------------------------------------------------------------------------------------------------------------------------    Call Back Information: OK to leave message on voicemail  Preferred Call Back Number: [phone]

## 2025-08-05 ENCOUNTER — OFFICE VISIT (OUTPATIENT)
Facility: CLINIC | Age: 70
End: 2025-08-05
Payer: MEDICARE

## 2025-08-05 VITALS
OXYGEN SATURATION: 96 % | RESPIRATION RATE: 18 BRPM | HEART RATE: 54 BPM | SYSTOLIC BLOOD PRESSURE: 128 MMHG | TEMPERATURE: 98.2 F | BODY MASS INDEX: 18.81 KG/M2 | HEIGHT: 58 IN | DIASTOLIC BLOOD PRESSURE: 72 MMHG | WEIGHT: 89.6 LBS

## 2025-08-05 DIAGNOSIS — E78.5 HYPERLIPIDEMIA, UNSPECIFIED HYPERLIPIDEMIA TYPE: ICD-10-CM

## 2025-08-05 DIAGNOSIS — M81.0 AGE-RELATED OSTEOPOROSIS WITHOUT CURRENT PATHOLOGICAL FRACTURE: ICD-10-CM

## 2025-08-05 DIAGNOSIS — E55.9 VITAMIN D DEFICIENCY, UNSPECIFIED: ICD-10-CM

## 2025-08-05 DIAGNOSIS — Z00.00 MEDICARE ANNUAL WELLNESS VISIT, SUBSEQUENT: Primary | ICD-10-CM

## 2025-08-05 DIAGNOSIS — Z12.31 ENCOUNTER FOR SCREENING MAMMOGRAM FOR MALIGNANT NEOPLASM OF BREAST: ICD-10-CM

## 2025-08-05 PROBLEM — Z98.49 S/P CATARACT SURGERY: Status: ACTIVE | Noted: 2022-03-16

## 2025-08-05 PROCEDURE — 1160F RVW MEDS BY RX/DR IN RCRD: CPT | Performed by: INTERNAL MEDICINE

## 2025-08-05 PROCEDURE — 1126F AMNT PAIN NOTED NONE PRSNT: CPT | Performed by: INTERNAL MEDICINE

## 2025-08-05 PROCEDURE — 1159F MED LIST DOCD IN RCRD: CPT | Performed by: INTERNAL MEDICINE

## 2025-08-05 PROCEDURE — 99214 OFFICE O/P EST MOD 30 MIN: CPT | Performed by: INTERNAL MEDICINE

## 2025-08-05 PROCEDURE — 1123F ACP DISCUSS/DSCN MKR DOCD: CPT | Performed by: INTERNAL MEDICINE

## 2025-08-05 PROCEDURE — G0439 PPPS, SUBSEQ VISIT: HCPCS | Performed by: INTERNAL MEDICINE

## 2025-08-05 SDOH — ECONOMIC STABILITY: FOOD INSECURITY: WITHIN THE PAST 12 MONTHS, YOU WORRIED THAT YOUR FOOD WOULD RUN OUT BEFORE YOU GOT MONEY TO BUY MORE.: NEVER TRUE

## 2025-08-05 SDOH — ECONOMIC STABILITY: FOOD INSECURITY: WITHIN THE PAST 12 MONTHS, THE FOOD YOU BOUGHT JUST DIDN'T LAST AND YOU DIDN'T HAVE MONEY TO GET MORE.: NEVER TRUE

## 2025-08-05 ASSESSMENT — PATIENT HEALTH QUESTIONNAIRE - PHQ9
SUM OF ALL RESPONSES TO PHQ QUESTIONS 1-9: 2
2. FEELING DOWN, DEPRESSED OR HOPELESS: MORE THAN HALF THE DAYS
SUM OF ALL RESPONSES TO PHQ QUESTIONS 1-9: 2
1. LITTLE INTEREST OR PLEASURE IN DOING THINGS: NOT AT ALL

## 2025-08-05 ASSESSMENT — LIFESTYLE VARIABLES: HOW OFTEN DO YOU HAVE A DRINK CONTAINING ALCOHOL: NEVER

## 2025-08-07 ENCOUNTER — HOSPITAL ENCOUNTER (OUTPATIENT)
Facility: HOSPITAL | Age: 70
Setting detail: SPECIMEN
Discharge: HOME | End: 2025-08-10

## 2025-08-07 LAB — SENTARA SPECIMEN COLLECTION: NORMAL

## 2025-08-07 PROCEDURE — 99001 SPECIMEN HANDLING PT-LAB: CPT
